# Patient Record
Sex: FEMALE | Race: BLACK OR AFRICAN AMERICAN | Employment: OTHER | URBAN - NONMETROPOLITAN AREA
[De-identification: names, ages, dates, MRNs, and addresses within clinical notes are randomized per-mention and may not be internally consistent; named-entity substitution may affect disease eponyms.]

---

## 2022-01-01 ENCOUNTER — APPOINTMENT (OUTPATIENT)
Dept: ULTRASOUND IMAGING | Age: 72
DRG: 870 | End: 2022-01-01
Attending: HOSPITALIST
Payer: MEDICARE

## 2022-01-01 ENCOUNTER — APPOINTMENT (OUTPATIENT)
Dept: GENERAL RADIOLOGY | Age: 72
DRG: 870 | End: 2022-01-01
Attending: HOSPITALIST
Payer: MEDICARE

## 2022-01-01 ENCOUNTER — HOSPITAL ENCOUNTER (INPATIENT)
Age: 72
LOS: 4 days | DRG: 870 | End: 2022-12-17
Attending: HOSPITALIST
Payer: MEDICARE

## 2022-01-01 VITALS
DIASTOLIC BLOOD PRESSURE: 57 MMHG | BODY MASS INDEX: 36.12 KG/M2 | RESPIRATION RATE: 38 BRPM | TEMPERATURE: 97.5 F | RESPIRATION RATE: 38 BRPM | SYSTOLIC BLOOD PRESSURE: 102 MMHG | HEIGHT: 67 IN | WEIGHT: 230.16 LBS | WEIGHT: 230.16 LBS | HEART RATE: 110 BPM | DIASTOLIC BLOOD PRESSURE: 57 MMHG | BODY MASS INDEX: 36.12 KG/M2 | OXYGEN SATURATION: 98 % | TEMPERATURE: 97.5 F | HEIGHT: 67 IN | SYSTOLIC BLOOD PRESSURE: 102 MMHG | HEART RATE: 110 BPM | OXYGEN SATURATION: 98 %

## 2022-01-01 LAB
ABO/RH: NORMAL
ABO/RH: NORMAL
ADENOVIRUS BY PCR: NOT DETECTED
ADENOVIRUS BY PCR: NOT DETECTED
ALBUMIN SERPL-MCNC: 2.3 G/DL (ref 3.5–5.2)
ALBUMIN SERPL-MCNC: 2.3 G/DL (ref 3.5–5.2)
ALBUMIN SERPL-MCNC: 2.6 G/DL (ref 3.5–5.2)
ALBUMIN SERPL-MCNC: 2.7 G/DL (ref 3.5–5.2)
ALBUMIN SERPL-MCNC: 2.7 G/DL (ref 3.5–5.2)
ALBUMIN SERPL-MCNC: 2.9 G/DL (ref 3.5–5.2)
ALBUMIN SERPL-MCNC: 2.9 G/DL (ref 3.5–5.2)
ALLENS TEST: ABNORMAL
ALP BLD-CCNC: 134 U/L (ref 35–104)
ALP BLD-CCNC: 134 U/L (ref 35–104)
ALP BLD-CCNC: 146 U/L (ref 35–104)
ALP BLD-CCNC: 146 U/L (ref 35–104)
ALP BLD-CCNC: 167 U/L (ref 35–104)
ALP BLD-CCNC: 172 U/L (ref 35–104)
ALP BLD-CCNC: 172 U/L (ref 35–104)
ALT SERPL-CCNC: 286 U/L (ref 5–33)
ALT SERPL-CCNC: 286 U/L (ref 5–33)
ALT SERPL-CCNC: 449 U/L (ref 5–33)
ALT SERPL-CCNC: 449 U/L (ref 5–33)
ALT SERPL-CCNC: 515 U/L (ref 5–33)
ALT SERPL-CCNC: 515 U/L (ref 5–33)
ALT SERPL-CCNC: 545 U/L (ref 5–33)
ALT SERPL-CCNC: 545 U/L (ref 5–33)
ALT SERPL-CCNC: 565 U/L (ref 5–33)
ALT SERPL-CCNC: 565 U/L (ref 5–33)
AMORPHOUS: ABNORMAL /HPF
AMORPHOUS: ABNORMAL /HPF
ANION GAP SERPL CALCULATED.3IONS-SCNC: 10 MMOL/L (ref 7–19)
ANION GAP SERPL CALCULATED.3IONS-SCNC: 13 MMOL/L (ref 7–19)
ANION GAP SERPL CALCULATED.3IONS-SCNC: 13 MMOL/L (ref 7–19)
ANION GAP SERPL CALCULATED.3IONS-SCNC: 19 MMOL/L (ref 7–19)
ANION GAP SERPL CALCULATED.3IONS-SCNC: 19 MMOL/L (ref 7–19)
ANION GAP SERPL CALCULATED.3IONS-SCNC: 8 MMOL/L (ref 7–19)
ANION GAP SERPL CALCULATED.3IONS-SCNC: 8 MMOL/L (ref 7–19)
ANTIBODY SCREEN: NORMAL
ANTIBODY SCREEN: NORMAL
AST SERPL-CCNC: 1049 U/L (ref 5–32)
AST SERPL-CCNC: 1049 U/L (ref 5–32)
AST SERPL-CCNC: 232 U/L (ref 5–32)
AST SERPL-CCNC: 232 U/L (ref 5–32)
AST SERPL-CCNC: 300 U/L (ref 5–32)
AST SERPL-CCNC: 300 U/L (ref 5–32)
AST SERPL-CCNC: 775 U/L (ref 5–32)
AST SERPL-CCNC: 775 U/L (ref 5–32)
AST SERPL-CCNC: 810 U/L (ref 5–32)
AST SERPL-CCNC: 810 U/L (ref 5–32)
BANDED NEUTROPHILS RELATIVE PERCENT: 11 % (ref 0–5)
BANDED NEUTROPHILS RELATIVE PERCENT: 11 % (ref 0–5)
BASE EXCESS ARTERIAL: -10.8 MMOL/L (ref -2–2)
BASE EXCESS ARTERIAL: -10.8 MMOL/L (ref -2–2)
BASE EXCESS ARTERIAL: 2.3 MMOL/L (ref -2–2)
BASE EXCESS ARTERIAL: 2.3 MMOL/L (ref -2–2)
BASE EXCESS ARTERIAL: 7.1 MMOL/L (ref -2–2)
BASOPHILS ABSOLUTE: 0 K/UL (ref 0–0.2)
BASOPHILS ABSOLUTE: 0.1 K/UL (ref 0–0.2)
BASOPHILS ABSOLUTE: 0.1 K/UL (ref 0–0.2)
BASOPHILS RELATIVE PERCENT: 0 % (ref 0–1)
BASOPHILS RELATIVE PERCENT: 0 % (ref 0–1)
BASOPHILS RELATIVE PERCENT: 0.3 % (ref 0–1)
BASOPHILS RELATIVE PERCENT: 0.3 % (ref 0–1)
BASOPHILS RELATIVE PERCENT: 0.4 % (ref 0–1)
BASOPHILS RELATIVE PERCENT: 0.4 % (ref 0–1)
BASOPHILS RELATIVE PERCENT: 0.5 % (ref 0–1)
BASOPHILS RELATIVE PERCENT: 0.5 % (ref 0–1)
BILIRUB SERPL-MCNC: 0.3 MG/DL (ref 0.2–1.2)
BILIRUB SERPL-MCNC: 0.3 MG/DL (ref 0.2–1.2)
BILIRUB SERPL-MCNC: 0.4 MG/DL (ref 0.2–1.2)
BILIRUB SERPL-MCNC: 0.4 MG/DL (ref 0.2–1.2)
BILIRUB SERPL-MCNC: 0.5 MG/DL (ref 0.2–1.2)
BILIRUB SERPL-MCNC: 1.5 MG/DL (ref 0.2–1.2)
BILIRUB SERPL-MCNC: 1.5 MG/DL (ref 0.2–1.2)
BILIRUBIN URINE: ABNORMAL
BILIRUBIN URINE: ABNORMAL
BLOOD BANK DISPENSE STATUS: NORMAL
BLOOD BANK DISPENSE STATUS: NORMAL
BLOOD BANK PRODUCT CODE: NORMAL
BLOOD BANK PRODUCT CODE: NORMAL
BLOOD, URINE: ABNORMAL
BLOOD, URINE: ABNORMAL
BORDETELLA PARAPERTUSSIS BY PCR: NOT DETECTED
BORDETELLA PARAPERTUSSIS BY PCR: NOT DETECTED
BORDETELLA PERTUSSIS BY PCR: NOT DETECTED
BORDETELLA PERTUSSIS BY PCR: NOT DETECTED
BPU ID: NORMAL
BPU ID: NORMAL
BUN BLDV-MCNC: 24 MG/DL (ref 8–23)
BUN BLDV-MCNC: 24 MG/DL (ref 8–23)
BUN BLDV-MCNC: 44 MG/DL (ref 8–23)
BUN BLDV-MCNC: 44 MG/DL (ref 8–23)
BUN BLDV-MCNC: 55 MG/DL (ref 8–23)
BUN BLDV-MCNC: 55 MG/DL (ref 8–23)
BUN BLDV-MCNC: 57 MG/DL (ref 8–23)
BUN BLDV-MCNC: 57 MG/DL (ref 8–23)
BUN BLDV-MCNC: 65 MG/DL (ref 8–23)
BUN BLDV-MCNC: 65 MG/DL (ref 8–23)
C-REACTIVE PROTEIN: 10.6 MG/DL (ref 0–0.5)
C-REACTIVE PROTEIN: 10.6 MG/DL (ref 0–0.5)
C-REACTIVE PROTEIN: 15.16 MG/DL (ref 0–0.5)
C-REACTIVE PROTEIN: 15.16 MG/DL (ref 0–0.5)
C-REACTIVE PROTEIN: 2.67 MG/DL (ref 0–0.5)
C-REACTIVE PROTEIN: 2.67 MG/DL (ref 0–0.5)
C-REACTIVE PROTEIN: 5.24 MG/DL (ref 0–0.5)
C-REACTIVE PROTEIN: 5.24 MG/DL (ref 0–0.5)
CALCIUM SERPL-MCNC: 7.7 MG/DL (ref 8.8–10.2)
CALCIUM SERPL-MCNC: 7.7 MG/DL (ref 8.8–10.2)
CALCIUM SERPL-MCNC: 7.8 MG/DL (ref 8.8–10.2)
CALCIUM SERPL-MCNC: 7.8 MG/DL (ref 8.8–10.2)
CALCIUM SERPL-MCNC: 8.1 MG/DL (ref 8.8–10.2)
CALCIUM SERPL-MCNC: 8.1 MG/DL (ref 8.8–10.2)
CALCIUM SERPL-MCNC: 8.2 MG/DL (ref 8.8–10.2)
CALCIUM SERPL-MCNC: 8.2 MG/DL (ref 8.8–10.2)
CALCIUM SERPL-MCNC: 8.6 MG/DL (ref 8.8–10.2)
CALCIUM SERPL-MCNC: 8.6 MG/DL (ref 8.8–10.2)
CARBOXYHEMOGLOBIN ARTERIAL: 1.5 % (ref 0–5)
CARBOXYHEMOGLOBIN ARTERIAL: 1.5 % (ref 0–5)
CARBOXYHEMOGLOBIN ARTERIAL: 1.6 % (ref 0–5)
CARBOXYHEMOGLOBIN ARTERIAL: 1.6 % (ref 0–5)
CARBOXYHEMOGLOBIN ARTERIAL: 1.7 % (ref 0–5)
CARBOXYHEMOGLOBIN ARTERIAL: 1.7 % (ref 0–5)
CARBOXYHEMOGLOBIN ARTERIAL: 2.1 % (ref 0–5)
CARBOXYHEMOGLOBIN ARTERIAL: 2.1 % (ref 0–5)
CHLAMYDOPHILIA PNEUMONIAE BY PCR: NOT DETECTED
CHLAMYDOPHILIA PNEUMONIAE BY PCR: NOT DETECTED
CHLORIDE BLD-SCNC: 100 MMOL/L (ref 98–111)
CHLORIDE BLD-SCNC: 101 MMOL/L (ref 98–111)
CHLORIDE BLD-SCNC: 101 MMOL/L (ref 98–111)
CHLORIDE BLD-SCNC: 102 MMOL/L (ref 98–111)
CHLORIDE BLD-SCNC: 102 MMOL/L (ref 98–111)
CHLORIDE BLD-SCNC: 103 MMOL/L (ref 98–111)
CHLORIDE BLD-SCNC: 103 MMOL/L (ref 98–111)
CHOLESTEROL, TOTAL: 83 MG/DL (ref 160–199)
CHOLESTEROL, TOTAL: 83 MG/DL (ref 160–199)
CLARITY: ABNORMAL
CLARITY: ABNORMAL
CO2: 21 MMOL/L (ref 22–29)
CO2: 21 MMOL/L (ref 22–29)
CO2: 25 MMOL/L (ref 22–29)
CO2: 25 MMOL/L (ref 22–29)
CO2: 28 MMOL/L (ref 22–29)
CO2: 31 MMOL/L (ref 22–29)
CO2: 31 MMOL/L (ref 22–29)
COARSE CASTS, UA: ABNORMAL /LPF (ref 0–5)
COARSE CASTS, UA: ABNORMAL /LPF (ref 0–5)
COLOR: ABNORMAL
COLOR: ABNORMAL
CORONAVIRUS 229E BY PCR: NOT DETECTED
CORONAVIRUS 229E BY PCR: NOT DETECTED
CORONAVIRUS HKU1 BY PCR: NOT DETECTED
CORONAVIRUS HKU1 BY PCR: NOT DETECTED
CORONAVIRUS NL63 BY PCR: NOT DETECTED
CORONAVIRUS NL63 BY PCR: NOT DETECTED
CORONAVIRUS OC43 BY PCR: NOT DETECTED
CORONAVIRUS OC43 BY PCR: NOT DETECTED
CREAT SERPL-MCNC: 1.1 MG/DL (ref 0.5–0.9)
CREAT SERPL-MCNC: 1.3 MG/DL (ref 0.5–0.9)
CREAT SERPL-MCNC: 1.3 MG/DL (ref 0.5–0.9)
CREAT SERPL-MCNC: 1.5 MG/DL (ref 0.5–0.9)
CREAT SERPL-MCNC: 1.5 MG/DL (ref 0.5–0.9)
CREAT SERPL-MCNC: 1.6 MG/DL (ref 0.5–0.9)
CREAT SERPL-MCNC: 1.6 MG/DL (ref 0.5–0.9)
CRYSTALS, UA: ABNORMAL /HPF
CRYSTALS, UA: ABNORMAL /HPF
CULTURE, RESPIRATORY: ABNORMAL
DESCRIPTION BLOOD BANK: NORMAL
DESCRIPTION BLOOD BANK: NORMAL
EKG P AXIS: 65 DEGREES
EKG P AXIS: 65 DEGREES
EKG P AXIS: 71 DEGREES
EKG P AXIS: 71 DEGREES
EKG P AXIS: 77 DEGREES
EKG P AXIS: 77 DEGREES
EKG P-R INTERVAL: 150 MS
EKG P-R INTERVAL: 150 MS
EKG P-R INTERVAL: 160 MS
EKG P-R INTERVAL: 160 MS
EKG P-R INTERVAL: 170 MS
EKG P-R INTERVAL: 170 MS
EKG Q-T INTERVAL: 422 MS
EKG Q-T INTERVAL: 422 MS
EKG Q-T INTERVAL: 470 MS
EKG Q-T INTERVAL: 470 MS
EKG Q-T INTERVAL: 502 MS
EKG Q-T INTERVAL: 502 MS
EKG QRS DURATION: 74 MS
EKG QRS DURATION: 74 MS
EKG QRS DURATION: 78 MS
EKG QRS DURATION: 78 MS
EKG QRS DURATION: 82 MS
EKG QRS DURATION: 82 MS
EKG QTC CALCULATION (BAZETT): 427 MS
EKG QTC CALCULATION (BAZETT): 427 MS
EKG QTC CALCULATION (BAZETT): 480 MS
EKG QTC CALCULATION (BAZETT): 480 MS
EKG QTC CALCULATION (BAZETT): 501 MS
EKG QTC CALCULATION (BAZETT): 501 MS
EKG T AXIS: -111 DEGREES
EKG T AXIS: -111 DEGREES
EKG T AXIS: -151 DEGREES
EKG T AXIS: -151 DEGREES
EKG T AXIS: 74 DEGREES
EKG T AXIS: 74 DEGREES
EOSINOPHILS ABSOLUTE: 0 K/UL (ref 0–0.6)
EOSINOPHILS RELATIVE PERCENT: 0 % (ref 0–5)
EOSINOPHILS RELATIVE PERCENT: 0.1 % (ref 0–5)
EOSINOPHILS RELATIVE PERCENT: 0.1 % (ref 0–5)
EPITHELIAL CELLS, UA: ABNORMAL /HPF
EPITHELIAL CELLS, UA: ABNORMAL /HPF
FIBRINOGEN: 438 MG/DL (ref 238–505)
FIBRINOGEN: 438 MG/DL (ref 238–505)
FIBRINOGEN: 454 MG/DL (ref 238–505)
FIBRINOGEN: 454 MG/DL (ref 238–505)
FIBRINOGEN: 553 MG/DL (ref 238–505)
FIBRINOGEN: 553 MG/DL (ref 238–505)
FIO2: 100 %
FIO2: 100 %
FIO2: 50 %
FIO2: 50 %
FIO2: 60 %
GFR SERPL CREATININE-BSD FRML MDRD: 34 ML/MIN/{1.73_M2}
GFR SERPL CREATININE-BSD FRML MDRD: 34 ML/MIN/{1.73_M2}
GFR SERPL CREATININE-BSD FRML MDRD: 37 ML/MIN/{1.73_M2}
GFR SERPL CREATININE-BSD FRML MDRD: 37 ML/MIN/{1.73_M2}
GFR SERPL CREATININE-BSD FRML MDRD: 44 ML/MIN/{1.73_M2}
GFR SERPL CREATININE-BSD FRML MDRD: 44 ML/MIN/{1.73_M2}
GFR SERPL CREATININE-BSD FRML MDRD: 54 ML/MIN/{1.73_M2}
GLUCOSE BLD-MCNC: 136 MG/DL (ref 74–109)
GLUCOSE BLD-MCNC: 136 MG/DL (ref 74–109)
GLUCOSE BLD-MCNC: 146 MG/DL (ref 74–109)
GLUCOSE BLD-MCNC: 146 MG/DL (ref 74–109)
GLUCOSE BLD-MCNC: 189 MG/DL (ref 74–109)
GLUCOSE BLD-MCNC: 189 MG/DL (ref 74–109)
GLUCOSE BLD-MCNC: 190 MG/DL (ref 74–109)
GLUCOSE BLD-MCNC: 190 MG/DL (ref 74–109)
GLUCOSE BLD-MCNC: 463 MG/DL (ref 74–109)
GLUCOSE BLD-MCNC: 463 MG/DL (ref 74–109)
GLUCOSE URINE: NEGATIVE MG/DL
GLUCOSE URINE: NEGATIVE MG/DL
GRAM STAIN RESULT: ABNORMAL
GRAM STAIN RESULT: ABNORMAL
HAV IGM SER IA-ACNC: NORMAL
HAV IGM SER IA-ACNC: NORMAL
HBA1C MFR BLD: 5.3 % (ref 4–6)
HBA1C MFR BLD: 5.3 % (ref 4–6)
HCO3 ARTERIAL: 15.9 MMOL/L (ref 22–26)
HCO3 ARTERIAL: 15.9 MMOL/L (ref 22–26)
HCO3 ARTERIAL: 28.3 MMOL/L (ref 22–26)
HCO3 ARTERIAL: 28.3 MMOL/L (ref 22–26)
HCO3 ARTERIAL: 32.5 MMOL/L (ref 22–26)
HCO3 ARTERIAL: 32.5 MMOL/L (ref 22–26)
HCO3 ARTERIAL: 33 MMOL/L (ref 22–26)
HCO3 ARTERIAL: 33 MMOL/L (ref 22–26)
HCT VFR BLD CALC: 23.9 % (ref 37–47)
HCT VFR BLD CALC: 23.9 % (ref 37–47)
HCT VFR BLD CALC: 25.1 % (ref 37–47)
HCT VFR BLD CALC: 25.1 % (ref 37–47)
HCT VFR BLD CALC: 34.1 % (ref 37–47)
HCT VFR BLD CALC: 34.1 % (ref 37–47)
HCT VFR BLD CALC: 34.2 % (ref 37–47)
HCT VFR BLD CALC: 34.2 % (ref 37–47)
HCT VFR BLD CALC: 34.4 % (ref 37–47)
HCT VFR BLD CALC: 34.4 % (ref 37–47)
HCT VFR BLD CALC: 37.6 % (ref 37–47)
HCT VFR BLD CALC: 37.6 % (ref 37–47)
HDLC SERPL-MCNC: 35 MG/DL (ref 65–121)
HDLC SERPL-MCNC: 35 MG/DL (ref 65–121)
HEMOGLOBIN, ART, EXTENDED: 11.1 G/DL (ref 12–16)
HEMOGLOBIN, ART, EXTENDED: 11.1 G/DL (ref 12–16)
HEMOGLOBIN, ART, EXTENDED: 11.7 G/DL (ref 12–16)
HEMOGLOBIN, ART, EXTENDED: 11.7 G/DL (ref 12–16)
HEMOGLOBIN, ART, EXTENDED: 12.3 G/DL (ref 12–16)
HEMOGLOBIN, ART, EXTENDED: 12.3 G/DL (ref 12–16)
HEMOGLOBIN, ART, EXTENDED: 7.7 G/DL (ref 12–16)
HEMOGLOBIN, ART, EXTENDED: 7.7 G/DL (ref 12–16)
HEMOGLOBIN: 10.9 G/DL (ref 12–16)
HEMOGLOBIN: 10.9 G/DL (ref 12–16)
HEMOGLOBIN: 11 G/DL (ref 12–16)
HEMOGLOBIN: 11 G/DL (ref 12–16)
HEMOGLOBIN: 11.3 G/DL (ref 12–16)
HEMOGLOBIN: 11.3 G/DL (ref 12–16)
HEMOGLOBIN: 12.1 G/DL (ref 12–16)
HEMOGLOBIN: 12.1 G/DL (ref 12–16)
HEMOGLOBIN: 7.4 G/DL (ref 12–16)
HEMOGLOBIN: 7.4 G/DL (ref 12–16)
HEMOGLOBIN: 7.8 G/DL (ref 12–16)
HEMOGLOBIN: 7.8 G/DL (ref 12–16)
HEPATITIS B CORE IGM ANTIBODY: NORMAL
HEPATITIS B CORE IGM ANTIBODY: NORMAL
HEPATITIS B SURFACE ANTIGEN INTERPRETATION: NORMAL
HEPATITIS B SURFACE ANTIGEN INTERPRETATION: NORMAL
HEPATITIS C ANTIBODY INTERPRETATION: NORMAL
HEPATITIS C ANTIBODY INTERPRETATION: NORMAL
HUMAN METAPNEUMOVIRUS BY PCR: NOT DETECTED
HUMAN METAPNEUMOVIRUS BY PCR: NOT DETECTED
HUMAN RHINOVIRUS/ENTEROVIRUS BY PCR: DETECTED
HUMAN RHINOVIRUS/ENTEROVIRUS BY PCR: DETECTED
HYALINE CASTS: ABNORMAL /LPF (ref 0–5)
HYALINE CASTS: ABNORMAL /LPF (ref 0–5)
IMMATURE GRANULOCYTES #: 0.5 K/UL
IMMATURE GRANULOCYTES #: 0.5 K/UL
IMMATURE GRANULOCYTES #: 0.7 K/UL
IMMATURE GRANULOCYTES #: 0.7 K/UL
IMMATURE GRANULOCYTES #: 1.3 K/UL
IMMATURE GRANULOCYTES #: 1.3 K/UL
IMMATURE GRANULOCYTES #: 6.5 K/UL
IMMATURE GRANULOCYTES #: 6.5 K/UL
INFLUENZA A H1-2009 BY PCR: DETECTED
INFLUENZA A H1-2009 BY PCR: DETECTED
INFLUENZA B BY PCR: NOT DETECTED
INFLUENZA B BY PCR: NOT DETECTED
KETONES, URINE: ABNORMAL MG/DL
KETONES, URINE: ABNORMAL MG/DL
L. PNEUMOPHILA SEROGP 1 UR AG: NORMAL
L. PNEUMOPHILA SEROGP 1 UR AG: NORMAL
LACTIC ACID: 13.3 MMOL/L (ref 0.5–1.9)
LACTIC ACID: 13.3 MMOL/L (ref 0.5–1.9)
LACTIC ACID: 2.2 MMOL/L (ref 0.5–1.9)
LACTIC ACID: 2.2 MMOL/L (ref 0.5–1.9)
LDL CHOLESTEROL CALCULATED: 30 MG/DL
LDL CHOLESTEROL CALCULATED: 30 MG/DL
LEUKOCYTE ESTERASE, URINE: NEGATIVE
LEUKOCYTE ESTERASE, URINE: NEGATIVE
LIPASE: 20 U/L (ref 13–60)
LIPASE: 20 U/L (ref 13–60)
LYMPHOCYTES ABSOLUTE: 1.1 K/UL (ref 1.1–4.5)
LYMPHOCYTES ABSOLUTE: 1.3 K/UL (ref 1.1–4.5)
LYMPHOCYTES ABSOLUTE: 1.3 K/UL (ref 1.1–4.5)
LYMPHOCYTES ABSOLUTE: 5.6 K/UL (ref 1.1–4.5)
LYMPHOCYTES ABSOLUTE: 5.6 K/UL (ref 1.1–4.5)
LYMPHOCYTES RELATIVE PERCENT: 10.5 % (ref 20–40)
LYMPHOCYTES RELATIVE PERCENT: 10.5 % (ref 20–40)
LYMPHOCYTES RELATIVE PERCENT: 14 % (ref 20–40)
LYMPHOCYTES RELATIVE PERCENT: 14 % (ref 20–40)
LYMPHOCYTES RELATIVE PERCENT: 8.7 % (ref 20–40)
LYMPHOCYTES RELATIVE PERCENT: 8.7 % (ref 20–40)
LYMPHOCYTES RELATIVE PERCENT: 9.5 % (ref 20–40)
LYMPHOCYTES RELATIVE PERCENT: 9.5 % (ref 20–40)
MAGNESIUM: 2 MG/DL (ref 1.6–2.4)
MAGNESIUM: 2.3 MG/DL (ref 1.6–2.4)
MAGNESIUM: 2.3 MG/DL (ref 1.6–2.4)
MAGNESIUM: 2.5 MG/DL (ref 1.6–2.4)
MAGNESIUM: 2.5 MG/DL (ref 1.6–2.4)
MAGNESIUM: 2.9 MG/DL (ref 1.6–2.4)
MAGNESIUM: 2.9 MG/DL (ref 1.6–2.4)
MCH RBC QN AUTO: 28.5 PG (ref 27–31)
MCH RBC QN AUTO: 28.5 PG (ref 27–31)
MCH RBC QN AUTO: 28.7 PG (ref 27–31)
MCH RBC QN AUTO: 28.9 PG (ref 27–31)
MCHC RBC AUTO-ENTMCNC: 31 G/DL (ref 33–37)
MCHC RBC AUTO-ENTMCNC: 31 G/DL (ref 33–37)
MCHC RBC AUTO-ENTMCNC: 32 G/DL (ref 33–37)
MCHC RBC AUTO-ENTMCNC: 32 G/DL (ref 33–37)
MCHC RBC AUTO-ENTMCNC: 32.2 G/DL (ref 33–37)
MCHC RBC AUTO-ENTMCNC: 32.8 G/DL (ref 33–37)
MCHC RBC AUTO-ENTMCNC: 32.8 G/DL (ref 33–37)
MCV RBC AUTO: 88 FL (ref 81–99)
MCV RBC AUTO: 88 FL (ref 81–99)
MCV RBC AUTO: 88.5 FL (ref 81–99)
MCV RBC AUTO: 88.5 FL (ref 81–99)
MCV RBC AUTO: 89.3 FL (ref 81–99)
MCV RBC AUTO: 89.3 FL (ref 81–99)
MCV RBC AUTO: 89.7 FL (ref 81–99)
MCV RBC AUTO: 89.7 FL (ref 81–99)
MCV RBC AUTO: 93.4 FL (ref 81–99)
MCV RBC AUTO: 93.4 FL (ref 81–99)
MECHANICAL RATE IN BPM: 18
MECHANICAL RATE IN BPM: 20
MECHANICAL RATE IN BPM: 20
METAMYELOCYTES RELATIVE PERCENT: 3 %
METAMYELOCYTES RELATIVE PERCENT: 3 %
METHEMOGLOBIN ARTERIAL: 1 %
METHEMOGLOBIN ARTERIAL: 1 %
METHEMOGLOBIN ARTERIAL: 1.2 %
METHEMOGLOBIN ARTERIAL: 1.2 %
METHEMOGLOBIN ARTERIAL: 1.3 %
METHEMOGLOBIN ARTERIAL: 1.3 %
METHEMOGLOBIN ARTERIAL: 1.6 %
METHEMOGLOBIN ARTERIAL: 1.6 %
MODE: ABNORMAL
MONOCYTES ABSOLUTE: 0.4 K/UL (ref 0–0.9)
MONOCYTES ABSOLUTE: 0.4 K/UL (ref 0–0.9)
MONOCYTES ABSOLUTE: 0.6 K/UL (ref 0–0.9)
MONOCYTES ABSOLUTE: 0.6 K/UL (ref 0–0.9)
MONOCYTES ABSOLUTE: 0.7 K/UL (ref 0–0.9)
MONOCYTES ABSOLUTE: 0.7 K/UL (ref 0–0.9)
MONOCYTES ABSOLUTE: 1.1 K/UL (ref 0–0.9)
MONOCYTES ABSOLUTE: 1.1 K/UL (ref 0–0.9)
MONOCYTES RELATIVE PERCENT: 1 % (ref 0–10)
MONOCYTES RELATIVE PERCENT: 1 % (ref 0–10)
MONOCYTES RELATIVE PERCENT: 5.1 % (ref 0–10)
MONOCYTES RELATIVE PERCENT: 5.1 % (ref 0–10)
MONOCYTES RELATIVE PERCENT: 5.4 % (ref 0–10)
MONOCYTES RELATIVE PERCENT: 5.4 % (ref 0–10)
MONOCYTES RELATIVE PERCENT: 7.4 % (ref 0–10)
MONOCYTES RELATIVE PERCENT: 7.4 % (ref 0–10)
MRSA SCREEN RT-PCR: NOT DETECTED
MRSA SCREEN RT-PCR: NOT DETECTED
MYCOPLASMA PNEUMONIAE BY PCR: NOT DETECTED
MYCOPLASMA PNEUMONIAE BY PCR: NOT DETECTED
NEUTROPHILS ABSOLUTE: 11.1 K/UL (ref 1.5–7.5)
NEUTROPHILS ABSOLUTE: 11.1 K/UL (ref 1.5–7.5)
NEUTROPHILS ABSOLUTE: 34.2 K/UL (ref 1.5–7.5)
NEUTROPHILS ABSOLUTE: 34.2 K/UL (ref 1.5–7.5)
NEUTROPHILS ABSOLUTE: 8.5 K/UL (ref 1.5–7.5)
NEUTROPHILS ABSOLUTE: 8.5 K/UL (ref 1.5–7.5)
NEUTROPHILS ABSOLUTE: 9.6 K/UL (ref 1.5–7.5)
NEUTROPHILS ABSOLUTE: 9.6 K/UL (ref 1.5–7.5)
NEUTROPHILS RELATIVE PERCENT: 71 % (ref 50–65)
NEUTROPHILS RELATIVE PERCENT: 71 % (ref 50–65)
NEUTROPHILS RELATIVE PERCENT: 74.5 % (ref 50–65)
NEUTROPHILS RELATIVE PERCENT: 74.5 % (ref 50–65)
NEUTROPHILS RELATIVE PERCENT: 77.9 % (ref 50–65)
NEUTROPHILS RELATIVE PERCENT: 77.9 % (ref 50–65)
NEUTROPHILS RELATIVE PERCENT: 80.4 % (ref 50–65)
NEUTROPHILS RELATIVE PERCENT: 80.4 % (ref 50–65)
NITRITE, URINE: NEGATIVE
NITRITE, URINE: NEGATIVE
O2 CONTENT ARTERIAL: 11.4 ML/DL
O2 CONTENT ARTERIAL: 11.4 ML/DL
O2 CONTENT ARTERIAL: 14.8 ML/DL
O2 CONTENT ARTERIAL: 14.8 ML/DL
O2 CONTENT ARTERIAL: 15.7 ML/DL
O2 CONTENT ARTERIAL: 15.7 ML/DL
O2 CONTENT ARTERIAL: 16.4 ML/DL
O2 CONTENT ARTERIAL: 16.4 ML/DL
O2 SAT, ARTERIAL: 94.4 %
O2 SAT, ARTERIAL: 94.4 %
O2 SAT, ARTERIAL: 94.7 %
O2 SAT, ARTERIAL: 94.7 %
O2 SAT, ARTERIAL: 95.1 %
O2 SAT, ARTERIAL: 95.1 %
O2 SAT, ARTERIAL: 97.2 %
O2 SAT, ARTERIAL: 97.2 %
O2 THERAPY: ABNORMAL
ORGANISM: ABNORMAL
ORGANISM: ABNORMAL
PARAINFLUENZA VIRUS 1 BY PCR: NOT DETECTED
PARAINFLUENZA VIRUS 1 BY PCR: NOT DETECTED
PARAINFLUENZA VIRUS 2 BY PCR: NOT DETECTED
PARAINFLUENZA VIRUS 2 BY PCR: NOT DETECTED
PARAINFLUENZA VIRUS 3 BY PCR: NOT DETECTED
PARAINFLUENZA VIRUS 3 BY PCR: NOT DETECTED
PARAINFLUENZA VIRUS 4 BY PCR: NOT DETECTED
PARAINFLUENZA VIRUS 4 BY PCR: NOT DETECTED
PCO2 ARTERIAL: 38 MMHG (ref 35–45)
PCO2 ARTERIAL: 38 MMHG (ref 35–45)
PCO2 ARTERIAL: 49 MMHG (ref 35–45)
PCO2 ARTERIAL: 52 MMHG (ref 35–45)
PCO2 ARTERIAL: 52 MMHG (ref 35–45)
PDW BLD-RTO: 12.9 % (ref 11.5–14.5)
PDW BLD-RTO: 12.9 % (ref 11.5–14.5)
PDW BLD-RTO: 13 % (ref 11.5–14.5)
PDW BLD-RTO: 13 % (ref 11.5–14.5)
PDW BLD-RTO: 13.1 % (ref 11.5–14.5)
PDW BLD-RTO: 13.2 % (ref 11.5–14.5)
PDW BLD-RTO: 13.2 % (ref 11.5–14.5)
PH ARTERIAL: 7.23 (ref 7.35–7.45)
PH ARTERIAL: 7.23 (ref 7.35–7.45)
PH ARTERIAL: 7.37 (ref 7.35–7.45)
PH ARTERIAL: 7.37 (ref 7.35–7.45)
PH ARTERIAL: 7.41 (ref 7.35–7.45)
PH ARTERIAL: 7.41 (ref 7.35–7.45)
PH ARTERIAL: 7.43 (ref 7.35–7.45)
PH ARTERIAL: 7.43 (ref 7.35–7.45)
PH UA: 5.5 (ref 5–8)
PH UA: 5.5 (ref 5–8)
PHOSPHORUS: 4.1 MG/DL (ref 2.5–4.5)
PHOSPHORUS: 4.1 MG/DL (ref 2.5–4.5)
PLATELET # BLD: 136 K/UL (ref 130–400)
PLATELET # BLD: 136 K/UL (ref 130–400)
PLATELET # BLD: 139 K/UL (ref 130–400)
PLATELET # BLD: 139 K/UL (ref 130–400)
PLATELET # BLD: 142 K/UL (ref 130–400)
PLATELET # BLD: 142 K/UL (ref 130–400)
PLATELET # BLD: 158 K/UL (ref 130–400)
PLATELET # BLD: 158 K/UL (ref 130–400)
PLATELET # BLD: 284 K/UL (ref 130–400)
PLATELET # BLD: 284 K/UL (ref 130–400)
PLATELET SLIDE REVIEW: ADEQUATE
PLATELET SLIDE REVIEW: ADEQUATE
PMV BLD AUTO: 10 FL (ref 9.4–12.3)
PMV BLD AUTO: 10.6 FL (ref 9.4–12.3)
PMV BLD AUTO: 10.6 FL (ref 9.4–12.3)
PMV BLD AUTO: 9.7 FL (ref 9.4–12.3)
PMV BLD AUTO: 9.7 FL (ref 9.4–12.3)
PMV BLD AUTO: 9.8 FL (ref 9.4–12.3)
PMV BLD AUTO: 9.8 FL (ref 9.4–12.3)
PO2 ARTERIAL: 321 MMHG (ref 80–100)
PO2 ARTERIAL: 321 MMHG (ref 80–100)
PO2 ARTERIAL: 73 MMHG (ref 80–100)
PO2 ARTERIAL: 73 MMHG (ref 80–100)
PO2 ARTERIAL: 77 MMHG (ref 80–100)
PO2 ARTERIAL: 77 MMHG (ref 80–100)
PO2 ARTERIAL: 81 MMHG (ref 80–100)
PO2 ARTERIAL: 81 MMHG (ref 80–100)
POSITIVE END EXP PRESS: 10
POSITIVE END EXP PRESS: 5
POSITIVE END EXP PRESS: 5
POTASSIUM SERPL-SCNC: 3.7 MMOL/L (ref 3.5–5)
POTASSIUM SERPL-SCNC: 4 MMOL/L (ref 3.5–5)
POTASSIUM SERPL-SCNC: 4 MMOL/L (ref 3.5–5)
POTASSIUM SERPL-SCNC: 4.4 MMOL/L (ref 3.5–5)
POTASSIUM SERPL-SCNC: 4.4 MMOL/L (ref 3.5–5)
POTASSIUM SERPL-SCNC: 5.7 MMOL/L (ref 3.5–5)
POTASSIUM SERPL-SCNC: 5.7 MMOL/L (ref 3.5–5)
POTASSIUM, WHOLE BLOOD: 3.5
POTASSIUM, WHOLE BLOOD: 3.5
POTASSIUM, WHOLE BLOOD: 3.6
POTASSIUM, WHOLE BLOOD: 3.6
POTASSIUM, WHOLE BLOOD: 4.2
POTASSIUM, WHOLE BLOOD: 4.2
POTASSIUM, WHOLE BLOOD: 5
POTASSIUM, WHOLE BLOOD: 5
PRO-BNP: 3779 PG/ML (ref 0–900)
PRO-BNP: 3779 PG/ML (ref 0–900)
PRO-BNP: 9301 PG/ML (ref 0–900)
PRO-BNP: 9301 PG/ML (ref 0–900)
PROTEIN UA: 100 MG/DL
PROTEIN UA: 100 MG/DL
RBC # BLD: 2.56 M/UL (ref 4.2–5.4)
RBC # BLD: 2.56 M/UL (ref 4.2–5.4)
RBC # BLD: 3.8 M/UL (ref 4.2–5.4)
RBC # BLD: 3.8 M/UL (ref 4.2–5.4)
RBC # BLD: 3.83 M/UL (ref 4.2–5.4)
RBC # BLD: 3.83 M/UL (ref 4.2–5.4)
RBC # BLD: 3.91 M/UL (ref 4.2–5.4)
RBC # BLD: 3.91 M/UL (ref 4.2–5.4)
RBC # BLD: 4.25 M/UL (ref 4.2–5.4)
RBC # BLD: 4.25 M/UL (ref 4.2–5.4)
RBC # BLD: NORMAL 10*6/UL
RBC # BLD: NORMAL 10*6/UL
RBC UA: ABNORMAL /HPF (ref 0–2)
RBC UA: ABNORMAL /HPF (ref 0–2)
RESPIRATORY SYNCYTIAL VIRUS BY PCR: NOT DETECTED
RESPIRATORY SYNCYTIAL VIRUS BY PCR: NOT DETECTED
SAMPLE SOURCE: ABNORMAL
SARS-COV-2, PCR: DETECTED
SARS-COV-2, PCR: DETECTED
SODIUM BLD-SCNC: 138 MMOL/L (ref 136–145)
SODIUM BLD-SCNC: 138 MMOL/L (ref 136–145)
SODIUM BLD-SCNC: 139 MMOL/L (ref 136–145)
SODIUM BLD-SCNC: 139 MMOL/L (ref 136–145)
SODIUM BLD-SCNC: 140 MMOL/L (ref 136–145)
SODIUM BLD-SCNC: 140 MMOL/L (ref 136–145)
SODIUM BLD-SCNC: 141 MMOL/L (ref 136–145)
SPECIFIC GRAVITY UA: 1.04 (ref 1–1.03)
SPECIFIC GRAVITY UA: 1.04 (ref 1–1.03)
STREP PNEUMONIAE ANTIGEN, URINE: NORMAL
STREP PNEUMONIAE ANTIGEN, URINE: NORMAL
TOTAL PROTEIN: 4 G/DL (ref 6.6–8.7)
TOTAL PROTEIN: 4 G/DL (ref 6.6–8.7)
TOTAL PROTEIN: 4.6 G/DL (ref 6.6–8.7)
TOTAL PROTEIN: 5 G/DL (ref 6.6–8.7)
TOTAL PROTEIN: 5 G/DL (ref 6.6–8.7)
TOTAL PROTEIN: 5.2 G/DL (ref 6.6–8.7)
TOTAL PROTEIN: 5.2 G/DL (ref 6.6–8.7)
TRIGL SERPL-MCNC: 91 MG/DL (ref 0–149)
TRIGL SERPL-MCNC: 91 MG/DL (ref 0–149)
TROPONIN: 0.38 NG/ML (ref 0–0.03)
TROPONIN: 0.38 NG/ML (ref 0–0.03)
TROPONIN: 0.52 NG/ML (ref 0–0.03)
TROPONIN: 0.52 NG/ML (ref 0–0.03)
TSH SERPL DL<=0.05 MIU/L-ACNC: 0.31 UIU/ML (ref 0.27–4.2)
TSH SERPL DL<=0.05 MIU/L-ACNC: 0.31 UIU/ML (ref 0.27–4.2)
URINE CULTURE, ROUTINE: NORMAL
URINE CULTURE, ROUTINE: NORMAL
UROBILINOGEN, URINE: 1 E.U./DL
UROBILINOGEN, URINE: 1 E.U./DL
VITAMIN D 25-HYDROXY: 9.6 NG/ML
VITAMIN D 25-HYDROXY: 9.6 NG/ML
VT MECHANICAL: 450 %
VT MECHANICAL: 50 %
VT MECHANICAL: 50 %
WBC # BLD: 10.9 K/UL (ref 4.8–10.8)
WBC # BLD: 10.9 K/UL (ref 4.8–10.8)
WBC # BLD: 12 K/UL (ref 4.8–10.8)
WBC # BLD: 12 K/UL (ref 4.8–10.8)
WBC # BLD: 14.9 K/UL (ref 4.8–10.8)
WBC # BLD: 14.9 K/UL (ref 4.8–10.8)
WBC # BLD: 40.2 K/UL (ref 4.8–10.8)
WBC # BLD: 40.2 K/UL (ref 4.8–10.8)
WBC # BLD: 7.3 K/UL (ref 4.8–10.8)
WBC # BLD: 7.3 K/UL (ref 4.8–10.8)
WBC UA: ABNORMAL /HPF (ref 0–5)
WBC UA: ABNORMAL /HPF (ref 0–5)

## 2022-01-01 PROCEDURE — 80061 LIPID PANEL: CPT

## 2022-01-01 PROCEDURE — 80053 COMPREHEN METABOLIC PANEL: CPT

## 2022-01-01 PROCEDURE — 86850 RBC ANTIBODY SCREEN: CPT

## 2022-01-01 PROCEDURE — 85384 FIBRINOGEN ACTIVITY: CPT

## 2022-01-01 PROCEDURE — 2700000000 HC OXYGEN THERAPY PER DAY

## 2022-01-01 PROCEDURE — 6360000002 HC RX W HCPCS: Performed by: INTERNAL MEDICINE

## 2022-01-01 PROCEDURE — 87040 BLOOD CULTURE FOR BACTERIA: CPT

## 2022-01-01 PROCEDURE — 99291 CRITICAL CARE FIRST HOUR: CPT | Performed by: INTERNAL MEDICINE

## 2022-01-01 PROCEDURE — 94760 N-INVAS EAR/PLS OXIMETRY 1: CPT

## 2022-01-01 PROCEDURE — 2500000003 HC RX 250 WO HCPCS: Performed by: INTERNAL MEDICINE

## 2022-01-01 PROCEDURE — 2100000000 HC CCU R&B

## 2022-01-01 PROCEDURE — 99222 1ST HOSP IP/OBS MODERATE 55: CPT | Performed by: INTERNAL MEDICINE

## 2022-01-01 PROCEDURE — 93005 ELECTROCARDIOGRAM TRACING: CPT | Performed by: HOSPITALIST

## 2022-01-01 PROCEDURE — 85025 COMPLETE CBC W/AUTO DIFF WBC: CPT

## 2022-01-01 PROCEDURE — 76937 US GUIDE VASCULAR ACCESS: CPT

## 2022-01-01 PROCEDURE — 82803 BLOOD GASES ANY COMBINATION: CPT

## 2022-01-01 PROCEDURE — 83605 ASSAY OF LACTIC ACID: CPT

## 2022-01-01 PROCEDURE — 83880 ASSAY OF NATRIURETIC PEPTIDE: CPT

## 2022-01-01 PROCEDURE — 6360000002 HC RX W HCPCS: Performed by: HOSPITALIST

## 2022-01-01 PROCEDURE — 86140 C-REACTIVE PROTEIN: CPT

## 2022-01-01 PROCEDURE — 6370000000 HC RX 637 (ALT 250 FOR IP): Performed by: INTERNAL MEDICINE

## 2022-01-01 PROCEDURE — 71045 X-RAY EXAM CHEST 1 VIEW: CPT

## 2022-01-01 PROCEDURE — 5A1955Z RESPIRATORY VENTILATION, GREATER THAN 96 CONSECUTIVE HOURS: ICD-10-PCS | Performed by: INTERNAL MEDICINE

## 2022-01-01 PROCEDURE — 87449 NOS EACH ORGANISM AG IA: CPT

## 2022-01-01 PROCEDURE — 76705 ECHO EXAM OF ABDOMEN: CPT

## 2022-01-01 PROCEDURE — 86923 COMPATIBILITY TEST ELECTRIC: CPT

## 2022-01-01 PROCEDURE — 37799 UNLISTED PX VASCULAR SURGERY: CPT

## 2022-01-01 PROCEDURE — 83735 ASSAY OF MAGNESIUM: CPT

## 2022-01-01 PROCEDURE — 2500000003 HC RX 250 WO HCPCS: Performed by: HOSPITALIST

## 2022-01-01 PROCEDURE — 71045 X-RAY EXAM CHEST 1 VIEW: CPT | Performed by: RADIOLOGY

## 2022-01-01 PROCEDURE — 87205 SMEAR GRAM STAIN: CPT

## 2022-01-01 PROCEDURE — 6360000002 HC RX W HCPCS

## 2022-01-01 PROCEDURE — 85014 HEMATOCRIT: CPT

## 2022-01-01 PROCEDURE — 84484 ASSAY OF TROPONIN QUANT: CPT

## 2022-01-01 PROCEDURE — 83690 ASSAY OF LIPASE: CPT

## 2022-01-01 PROCEDURE — 2580000003 HC RX 258: Performed by: HOSPITALIST

## 2022-01-01 PROCEDURE — 2500000003 HC RX 250 WO HCPCS: Performed by: STUDENT IN AN ORGANIZED HEALTH CARE EDUCATION/TRAINING PROGRAM

## 2022-01-01 PROCEDURE — 02HV33Z INSERTION OF INFUSION DEVICE INTO SUPERIOR VENA CAVA, PERCUTANEOUS APPROACH: ICD-10-PCS | Performed by: INTERNAL MEDICINE

## 2022-01-01 PROCEDURE — 94003 VENT MGMT INPAT SUBQ DAY: CPT

## 2022-01-01 PROCEDURE — C1751 CATH, INF, PER/CENT/MIDLINE: HCPCS

## 2022-01-01 PROCEDURE — 86901 BLOOD TYPING SEROLOGIC RH(D): CPT

## 2022-01-01 PROCEDURE — C9113 INJ PANTOPRAZOLE SODIUM, VIA: HCPCS | Performed by: HOSPITALIST

## 2022-01-01 PROCEDURE — 2580000003 HC RX 258: Performed by: INTERNAL MEDICINE

## 2022-01-01 PROCEDURE — 6360000004 HC RX CONTRAST MEDICATION: Performed by: INTERNAL MEDICINE

## 2022-01-01 PROCEDURE — 6370000000 HC RX 637 (ALT 250 FOR IP): Performed by: HOSPITALIST

## 2022-01-01 PROCEDURE — 6360000002 HC RX W HCPCS: Performed by: STUDENT IN AN ORGANIZED HEALTH CARE EDUCATION/TRAINING PROGRAM

## 2022-01-01 PROCEDURE — C8929 TTE W OR WO FOL WCON,DOPPLER: HCPCS

## 2022-01-01 PROCEDURE — 93005 ELECTROCARDIOGRAM TRACING: CPT | Performed by: INTERNAL MEDICINE

## 2022-01-01 PROCEDURE — 83036 HEMOGLOBIN GLYCOSYLATED A1C: CPT

## 2022-01-01 PROCEDURE — 85027 COMPLETE CBC AUTOMATED: CPT

## 2022-01-01 PROCEDURE — 99232 SBSQ HOSP IP/OBS MODERATE 35: CPT | Performed by: INTERNAL MEDICINE

## 2022-01-01 PROCEDURE — 85018 HEMOGLOBIN: CPT

## 2022-01-01 PROCEDURE — 87070 CULTURE OTHR SPECIMN AEROBIC: CPT

## 2022-01-01 PROCEDURE — 81001 URINALYSIS AUTO W/SCOPE: CPT

## 2022-01-01 PROCEDURE — 93010 ELECTROCARDIOGRAM REPORT: CPT | Performed by: INTERNAL MEDICINE

## 2022-01-01 PROCEDURE — 36600 WITHDRAWAL OF ARTERIAL BLOOD: CPT

## 2022-01-01 PROCEDURE — 80074 ACUTE HEPATITIS PANEL: CPT

## 2022-01-01 PROCEDURE — 87086 URINE CULTURE/COLONY COUNT: CPT

## 2022-01-01 PROCEDURE — 86900 BLOOD TYPING SEROLOGIC ABO: CPT

## 2022-01-01 PROCEDURE — 84100 ASSAY OF PHOSPHORUS: CPT

## 2022-01-01 PROCEDURE — 0202U NFCT DS 22 TRGT SARS-COV-2: CPT

## 2022-01-01 PROCEDURE — 84443 ASSAY THYROID STIM HORMONE: CPT

## 2022-01-01 PROCEDURE — 36415 COLL VENOUS BLD VENIPUNCTURE: CPT

## 2022-01-01 PROCEDURE — 36569 INSJ PICC 5 YR+ W/O IMAGING: CPT

## 2022-01-01 PROCEDURE — 94002 VENT MGMT INPAT INIT DAY: CPT

## 2022-01-01 PROCEDURE — 76705 ECHO EXAM OF ABDOMEN: CPT | Performed by: RADIOLOGY

## 2022-01-01 PROCEDURE — 87641 MR-STAPH DNA AMP PROBE: CPT

## 2022-01-01 PROCEDURE — XW0DXM6 INTRODUCTION OF BARICITINIB INTO MOUTH AND PHARYNX, EXTERNAL APPROACH, NEW TECHNOLOGY GROUP 6: ICD-10-PCS | Performed by: INTERNAL MEDICINE

## 2022-01-01 PROCEDURE — 3E0333Z INTRODUCTION OF ANTI-INFLAMMATORY INTO PERIPHERAL VEIN, PERCUTANEOUS APPROACH: ICD-10-PCS | Performed by: INTERNAL MEDICINE

## 2022-01-01 PROCEDURE — 82306 VITAMIN D 25 HYDROXY: CPT

## 2022-01-01 PROCEDURE — 87186 SC STD MICRODIL/AGAR DIL: CPT

## 2022-01-01 PROCEDURE — 92950 HEART/LUNG RESUSCITATION CPR: CPT

## 2022-01-01 RX ORDER — FUROSEMIDE 10 MG/ML
20 INJECTION INTRAMUSCULAR; INTRAVENOUS 2 TIMES DAILY
Status: DISCONTINUED | OUTPATIENT
Start: 2022-01-01 | End: 2022-01-01 | Stop reason: HOSPADM

## 2022-01-01 RX ORDER — MEROPENEM AND SODIUM CHLORIDE 1 G/50ML
1000 INJECTION, SOLUTION INTRAVENOUS EVERY 12 HOURS
Status: DISCONTINUED | OUTPATIENT
Start: 2022-01-01 | End: 2022-01-01 | Stop reason: HOSPADM

## 2022-01-01 RX ORDER — NOREPINEPHRINE BIT/0.9 % NACL 16MG/250ML
1-100 INFUSION BOTTLE (ML) INTRAVENOUS CONTINUOUS
Status: DISCONTINUED | OUTPATIENT
Start: 2022-01-01 | End: 2022-01-01 | Stop reason: HOSPADM

## 2022-01-01 RX ORDER — LORAZEPAM 2 MG/ML
2 INJECTION INTRAMUSCULAR EVERY 6 HOURS PRN
Status: DISCONTINUED | OUTPATIENT
Start: 2022-01-01 | End: 2022-01-01 | Stop reason: HOSPADM

## 2022-01-01 RX ORDER — PROPOFOL 10 MG/ML
INJECTION, EMULSION INTRAVENOUS
Status: COMPLETED
Start: 2022-01-01 | End: 2022-01-01

## 2022-01-01 RX ORDER — ACETAMINOPHEN 650 MG/1
650 SUPPOSITORY RECTAL EVERY 6 HOURS PRN
Status: DISCONTINUED | OUTPATIENT
Start: 2022-01-01 | End: 2022-01-01 | Stop reason: HOSPADM

## 2022-01-01 RX ORDER — SODIUM CHLORIDE 9 MG/ML
INJECTION, SOLUTION INTRAVENOUS PRN
Status: DISCONTINUED | OUTPATIENT
Start: 2022-01-01 | End: 2022-01-01 | Stop reason: SDUPTHER

## 2022-01-01 RX ORDER — MEROPENEM AND SODIUM CHLORIDE 1 G/50ML
1000 INJECTION, SOLUTION INTRAVENOUS ONCE
Status: COMPLETED | OUTPATIENT
Start: 2022-01-01 | End: 2022-01-01

## 2022-01-01 RX ORDER — OSELTAMIVIR PHOSPHATE 6 MG/ML
75 FOR SUSPENSION ORAL 2 TIMES DAILY
Status: DISCONTINUED | OUTPATIENT
Start: 2022-01-01 | End: 2022-01-01

## 2022-01-01 RX ORDER — MEROPENEM AND SODIUM CHLORIDE 1 G/50ML
1000 INJECTION, SOLUTION INTRAVENOUS EVERY 8 HOURS
Status: DISCONTINUED | OUTPATIENT
Start: 2022-01-01 | End: 2022-01-01 | Stop reason: DRUGHIGH

## 2022-01-01 RX ORDER — ERGOCALCIFEROL 1.25 MG/1
50000 CAPSULE ORAL WEEKLY
Status: DISCONTINUED | OUTPATIENT
Start: 2022-01-01 | End: 2022-01-01

## 2022-01-01 RX ORDER — ENOXAPARIN SODIUM 100 MG/ML
30 INJECTION SUBCUTANEOUS 2 TIMES DAILY
Status: DISCONTINUED | OUTPATIENT
Start: 2022-01-01 | End: 2022-01-01

## 2022-01-01 RX ORDER — MEROPENEM AND SODIUM CHLORIDE 1 G/50ML
1000 INJECTION, SOLUTION INTRAVENOUS EVERY 8 HOURS
Status: DISCONTINUED | OUTPATIENT
Start: 2022-01-01 | End: 2022-01-01

## 2022-01-01 RX ORDER — LEVOTHYROXINE SODIUM 0.03 MG/1
25 TABLET ORAL
COMMUNITY

## 2022-01-01 RX ORDER — BUSPIRONE HYDROCHLORIDE 30 MG/1
30 TABLET ORAL 2 TIMES DAILY
COMMUNITY

## 2022-01-01 RX ORDER — HYDROXYZINE 50 MG/1
50 TABLET, FILM COATED ORAL NIGHTLY
COMMUNITY

## 2022-01-01 RX ORDER — FENTANYL CITRATE-0.9 % NACL/PF 10 MCG/ML
25-200 PLASTIC BAG, INJECTION (ML) INTRAVENOUS CONTINUOUS
Status: DISCONTINUED | OUTPATIENT
Start: 2022-01-01 | End: 2022-01-01 | Stop reason: HOSPADM

## 2022-01-01 RX ORDER — BUPROPION HYDROCHLORIDE 150 MG/1
150 TABLET ORAL EVERY MORNING
COMMUNITY

## 2022-01-01 RX ORDER — DULOXETIN HYDROCHLORIDE 60 MG/1
60 CAPSULE, DELAYED RELEASE ORAL NIGHTLY
COMMUNITY

## 2022-01-01 RX ORDER — EPINEPHRINE 0.1 MG/ML
SYRINGE (ML) INJECTION DAILY PRN
Status: COMPLETED | OUTPATIENT
Start: 2022-01-01 | End: 2022-01-01

## 2022-01-01 RX ORDER — CARVEDILOL 3.12 MG/1
3.12 TABLET ORAL 2 TIMES DAILY WITH MEALS
Status: DISCONTINUED | OUTPATIENT
Start: 2022-01-01 | End: 2022-01-01 | Stop reason: HOSPADM

## 2022-01-01 RX ORDER — IPRATROPIUM BROMIDE AND ALBUTEROL SULFATE 2.5; .5 MG/3ML; MG/3ML
1 SOLUTION RESPIRATORY (INHALATION)
Status: DISCONTINUED | OUTPATIENT
Start: 2022-01-01 | End: 2022-01-01

## 2022-01-01 RX ORDER — GABAPENTIN 300 MG/1
300 CAPSULE ORAL 3 TIMES DAILY
COMMUNITY

## 2022-01-01 RX ORDER — MECOBALAMIN 5000 MCG
10 TABLET,DISINTEGRATING ORAL NIGHTLY
Status: DISCONTINUED | OUTPATIENT
Start: 2022-01-01 | End: 2022-01-01 | Stop reason: HOSPADM

## 2022-01-01 RX ORDER — ONDANSETRON 4 MG/1
4 TABLET, ORALLY DISINTEGRATING ORAL EVERY 8 HOURS PRN
COMMUNITY

## 2022-01-01 RX ORDER — SODIUM CHLORIDE 0.9 % (FLUSH) 0.9 %
5-40 SYRINGE (ML) INJECTION PRN
Status: DISCONTINUED | OUTPATIENT
Start: 2022-01-01 | End: 2022-01-01 | Stop reason: HOSPADM

## 2022-01-01 RX ORDER — METOPROLOL SUCCINATE 100 MG/1
100 TABLET, EXTENDED RELEASE ORAL NIGHTLY
COMMUNITY

## 2022-01-01 RX ORDER — SODIUM CHLORIDE 9 MG/ML
25 INJECTION, SOLUTION INTRAVENOUS PRN
Status: DISCONTINUED | OUTPATIENT
Start: 2022-01-01 | End: 2022-01-01 | Stop reason: HOSPADM

## 2022-01-01 RX ORDER — ZINC SULFATE 50(220)MG
50 CAPSULE ORAL DAILY
Status: DISCONTINUED | OUTPATIENT
Start: 2022-01-01 | End: 2022-01-01 | Stop reason: HOSPADM

## 2022-01-01 RX ORDER — MAGNESIUM SULFATE 1 G/100ML
1000 INJECTION INTRAVENOUS ONCE
Status: COMPLETED | OUTPATIENT
Start: 2022-01-01 | End: 2022-01-01

## 2022-01-01 RX ORDER — ONDANSETRON 4 MG/1
4 TABLET, ORALLY DISINTEGRATING ORAL EVERY 8 HOURS PRN
Status: DISCONTINUED | OUTPATIENT
Start: 2022-01-01 | End: 2022-01-01

## 2022-01-01 RX ORDER — SODIUM CHLORIDE 0.9 % (FLUSH) 0.9 %
5-40 SYRINGE (ML) INJECTION EVERY 12 HOURS SCHEDULED
Status: DISCONTINUED | OUTPATIENT
Start: 2022-01-01 | End: 2022-01-01 | Stop reason: HOSPADM

## 2022-01-01 RX ORDER — ROSUVASTATIN CALCIUM 20 MG/1
20 TABLET, COATED ORAL NIGHTLY
COMMUNITY

## 2022-01-01 RX ORDER — PROPOFOL 10 MG/ML
5-50 INJECTION, EMULSION INTRAVENOUS CONTINUOUS
Status: DISCONTINUED | OUTPATIENT
Start: 2022-01-01 | End: 2022-01-01

## 2022-01-01 RX ORDER — ENOXAPARIN SODIUM 100 MG/ML
1 INJECTION SUBCUTANEOUS 2 TIMES DAILY
Status: DISCONTINUED | OUTPATIENT
Start: 2022-01-01 | End: 2022-01-01 | Stop reason: HOSPADM

## 2022-01-01 RX ORDER — ONDANSETRON 2 MG/ML
4 INJECTION INTRAMUSCULAR; INTRAVENOUS EVERY 6 HOURS PRN
Status: DISCONTINUED | OUTPATIENT
Start: 2022-01-01 | End: 2022-01-01

## 2022-01-01 RX ORDER — ASCORBIC ACID 500 MG
500 TABLET ORAL 2 TIMES DAILY
Status: DISCONTINUED | OUTPATIENT
Start: 2022-01-01 | End: 2022-01-01 | Stop reason: HOSPADM

## 2022-01-01 RX ORDER — SODIUM CHLORIDE 9 MG/ML
INJECTION, SOLUTION INTRAVENOUS CONTINUOUS
Status: DISCONTINUED | OUTPATIENT
Start: 2022-01-01 | End: 2022-01-01

## 2022-01-01 RX ORDER — ERGOCALCIFEROL 1.25 MG/1
50000 CAPSULE ORAL WEEKLY
Status: DISCONTINUED | OUTPATIENT
Start: 2022-01-01 | End: 2022-01-01 | Stop reason: HOSPADM

## 2022-01-01 RX ORDER — POLYETHYLENE GLYCOL 3350 17 G/17G
17 POWDER, FOR SOLUTION ORAL DAILY PRN
Status: DISCONTINUED | OUTPATIENT
Start: 2022-01-01 | End: 2022-01-01 | Stop reason: HOSPADM

## 2022-01-01 RX ORDER — MIRTAZAPINE 30 MG/1
45 TABLET, FILM COATED ORAL NIGHTLY
COMMUNITY

## 2022-01-01 RX ORDER — SODIUM CHLORIDE 0.9 % (FLUSH) 0.9 %
5-40 SYRINGE (ML) INJECTION PRN
Status: DISCONTINUED | OUTPATIENT
Start: 2022-01-01 | End: 2022-01-01 | Stop reason: SDUPTHER

## 2022-01-01 RX ORDER — LORAZEPAM 2 MG/ML
1 INJECTION INTRAMUSCULAR
Status: DISCONTINUED | OUTPATIENT
Start: 2022-01-01 | End: 2022-01-01 | Stop reason: HOSPADM

## 2022-01-01 RX ORDER — LIDOCAINE HYDROCHLORIDE 10 MG/ML
5 INJECTION, SOLUTION EPIDURAL; INFILTRATION; INTRACAUDAL; PERINEURAL ONCE
Status: DISCONTINUED | OUTPATIENT
Start: 2022-01-01 | End: 2022-01-01 | Stop reason: HOSPADM

## 2022-01-01 RX ORDER — FOLIC ACID 1 MG/1
1 TABLET ORAL DAILY
COMMUNITY

## 2022-01-01 RX ORDER — GUAIFENESIN 200 MG/1
400 TABLET ORAL EVERY 8 HOURS
Status: DISCONTINUED | OUTPATIENT
Start: 2022-01-01 | End: 2022-01-01 | Stop reason: HOSPADM

## 2022-01-01 RX ORDER — ACETAMINOPHEN 325 MG/1
650 TABLET ORAL EVERY 6 HOURS PRN
Status: DISCONTINUED | OUTPATIENT
Start: 2022-01-01 | End: 2022-01-01 | Stop reason: SDUPTHER

## 2022-01-01 RX ORDER — HYDROCODONE BITARTRATE AND ACETAMINOPHEN 7.5; 325 MG/1; MG/1
1 TABLET ORAL EVERY 6 HOURS PRN
COMMUNITY

## 2022-01-01 RX ORDER — SODIUM CHLORIDE 0.9 % (FLUSH) 0.9 %
5-40 SYRINGE (ML) INJECTION EVERY 12 HOURS SCHEDULED
Status: DISCONTINUED | OUTPATIENT
Start: 2022-01-01 | End: 2022-01-01 | Stop reason: SDUPTHER

## 2022-01-01 RX ORDER — ALBUTEROL SULFATE 90 UG/1
2 AEROSOL, METERED RESPIRATORY (INHALATION)
Status: DISCONTINUED | OUTPATIENT
Start: 2022-01-01 | End: 2022-01-01 | Stop reason: HOSPADM

## 2022-01-01 RX ORDER — SODIUM CHLORIDE 9 MG/ML
INJECTION, SOLUTION INTRAVENOUS PRN
Status: DISCONTINUED | OUTPATIENT
Start: 2022-01-01 | End: 2022-01-01 | Stop reason: HOSPADM

## 2022-01-01 RX ORDER — ACETAMINOPHEN 325 MG/1
650 TABLET ORAL EVERY 6 HOURS PRN
Status: DISCONTINUED | OUTPATIENT
Start: 2022-01-01 | End: 2022-01-01 | Stop reason: HOSPADM

## 2022-01-01 RX ORDER — PROPOFOL 10 MG/ML
5-50 INJECTION, EMULSION INTRAVENOUS CONTINUOUS
Status: DISCONTINUED | OUTPATIENT
Start: 2022-01-01 | End: 2022-01-01 | Stop reason: HOSPADM

## 2022-01-01 RX ORDER — MONTELUKAST SODIUM 10 MG/1
10 TABLET ORAL NIGHTLY
COMMUNITY

## 2022-01-01 RX ORDER — DEXMEDETOMIDINE HYDROCHLORIDE 4 UG/ML
.1-1.5 INJECTION, SOLUTION INTRAVENOUS CONTINUOUS
Status: DISCONTINUED | OUTPATIENT
Start: 2022-01-01 | End: 2022-01-01 | Stop reason: HOSPADM

## 2022-01-01 RX ORDER — DEXAMETHASONE SODIUM PHOSPHATE 10 MG/ML
6 INJECTION, SOLUTION INTRAMUSCULAR; INTRAVENOUS EVERY 12 HOURS
Status: DISCONTINUED | OUTPATIENT
Start: 2022-01-01 | End: 2022-01-01 | Stop reason: HOSPADM

## 2022-01-01 RX ORDER — ATORVASTATIN CALCIUM 40 MG/1
40 TABLET, FILM COATED ORAL NIGHTLY
Status: DISCONTINUED | OUTPATIENT
Start: 2022-01-01 | End: 2022-01-01 | Stop reason: HOSPADM

## 2022-01-01 RX ORDER — MELOXICAM 7.5 MG/1
7.5 TABLET ORAL DAILY
COMMUNITY

## 2022-01-01 RX ORDER — OSELTAMIVIR PHOSPHATE 6 MG/ML
30 FOR SUSPENSION ORAL 2 TIMES DAILY
Status: DISCONTINUED | OUTPATIENT
Start: 2022-01-01 | End: 2022-01-01 | Stop reason: HOSPADM

## 2022-01-01 RX ORDER — LISINOPRIL 40 MG/1
40 TABLET ORAL DAILY
COMMUNITY

## 2022-01-01 RX ORDER — VITAMIN B COMPLEX
2000 TABLET ORAL DAILY
Status: DISCONTINUED | OUTPATIENT
Start: 2022-01-01 | End: 2022-01-01 | Stop reason: HOSPADM

## 2022-01-01 RX ORDER — MEROPENEM AND SODIUM CHLORIDE 1 G/50ML
1000 INJECTION, SOLUTION INTRAVENOUS EVERY 12 HOURS
Status: DISCONTINUED | OUTPATIENT
Start: 2022-01-01 | End: 2022-01-01

## 2022-01-01 RX ADMIN — VANCOMYCIN HYDROCHLORIDE 2500 MG: 1 INJECTION, POWDER, LYOPHILIZED, FOR SOLUTION INTRAVENOUS at 07:03

## 2022-01-01 RX ADMIN — GUAIFENESIN 400 MG: 200 TABLET ORAL at 17:14

## 2022-01-01 RX ADMIN — LORAZEPAM 1 MG: 2 INJECTION INTRAMUSCULAR; INTRAVENOUS at 10:18

## 2022-01-01 RX ADMIN — ALBUTEROL SULFATE 2 PUFF: 90 AEROSOL, METERED RESPIRATORY (INHALATION) at 07:58

## 2022-01-01 RX ADMIN — SODIUM CHLORIDE, PRESERVATIVE FREE 10 ML: 5 INJECTION INTRAVENOUS at 21:34

## 2022-01-01 RX ADMIN — ALBUTEROL SULFATE 2 PUFF: 90 AEROSOL, METERED RESPIRATORY (INHALATION) at 08:55

## 2022-01-01 RX ADMIN — MEROPENEM AND SODIUM CHLORIDE 1000 MG: 1 INJECTION, SOLUTION INTRAVENOUS at 23:20

## 2022-01-01 RX ADMIN — LORAZEPAM 1 MG: 2 INJECTION INTRAMUSCULAR; INTRAVENOUS at 14:53

## 2022-01-01 RX ADMIN — PROPOFOL 10 MCG/KG/MIN: 10 INJECTION, EMULSION INTRAVENOUS at 02:35

## 2022-01-01 RX ADMIN — Medication 2.5 MCG/MIN: at 20:06

## 2022-01-01 RX ADMIN — MEROPENEM AND SODIUM CHLORIDE 1000 MG: 1 INJECTION, SOLUTION INTRAVENOUS at 10:24

## 2022-01-01 RX ADMIN — GUAIFENESIN 400 MG: 200 TABLET ORAL at 08:37

## 2022-01-01 RX ADMIN — GUAIFENESIN 400 MG: 200 TABLET ORAL at 17:29

## 2022-01-01 RX ADMIN — SODIUM CHLORIDE, PRESERVATIVE FREE 10 ML: 5 INJECTION INTRAVENOUS at 08:40

## 2022-01-01 RX ADMIN — LORAZEPAM 1 MG: 2 INJECTION INTRAMUSCULAR; INTRAVENOUS at 08:38

## 2022-01-01 RX ADMIN — FUROSEMIDE 20 MG: 10 INJECTION, SOLUTION INTRAMUSCULAR; INTRAVENOUS at 18:34

## 2022-01-01 RX ADMIN — OXYCODONE HYDROCHLORIDE AND ACETAMINOPHEN 500 MG: 500 TABLET ORAL at 20:29

## 2022-01-01 RX ADMIN — DEXAMETHASONE SODIUM PHOSPHATE 6 MG: 10 INJECTION, SOLUTION INTRAMUSCULAR; INTRAVENOUS at 07:47

## 2022-01-01 RX ADMIN — ENOXAPARIN SODIUM 80 MG: 100 INJECTION SUBCUTANEOUS at 19:57

## 2022-01-01 RX ADMIN — ALBUTEROL SULFATE 2 PUFF: 90 AEROSOL, METERED RESPIRATORY (INHALATION) at 20:16

## 2022-01-01 RX ADMIN — OXYCODONE HYDROCHLORIDE AND ACETAMINOPHEN 500 MG: 500 TABLET ORAL at 19:58

## 2022-01-01 RX ADMIN — IPRATROPIUM BROMIDE 2 PUFF: 17 AEROSOL, METERED RESPIRATORY (INHALATION) at 22:14

## 2022-01-01 RX ADMIN — MEROPENEM AND SODIUM CHLORIDE 1000 MG: 1 INJECTION, SOLUTION INTRAVENOUS at 21:50

## 2022-01-01 RX ADMIN — IPRATROPIUM BROMIDE 2 PUFF: 17 AEROSOL, METERED RESPIRATORY (INHALATION) at 06:18

## 2022-01-01 RX ADMIN — ATORVASTATIN CALCIUM 40 MG: 40 TABLET, FILM COATED ORAL at 20:15

## 2022-01-01 RX ADMIN — LORAZEPAM 1 MG: 2 INJECTION INTRAMUSCULAR; INTRAVENOUS at 13:13

## 2022-01-01 RX ADMIN — IPRATROPIUM BROMIDE 2 PUFF: 17 AEROSOL, METERED RESPIRATORY (INHALATION) at 17:42

## 2022-01-01 RX ADMIN — BARICITINIB 2 MG: 2 TABLET, FILM COATED ORAL at 07:51

## 2022-01-01 RX ADMIN — DEXAMETHASONE SODIUM PHOSPHATE 6 MG: 10 INJECTION, SOLUTION INTRAMUSCULAR; INTRAVENOUS at 21:32

## 2022-01-01 RX ADMIN — ALBUTEROL SULFATE 2 PUFF: 90 AEROSOL, METERED RESPIRATORY (INHALATION) at 21:00

## 2022-01-01 RX ADMIN — DEXAMETHASONE SODIUM PHOSPHATE 6 MG: 10 INJECTION, SOLUTION INTRAMUSCULAR; INTRAVENOUS at 07:53

## 2022-01-01 RX ADMIN — LORAZEPAM 1 MG: 2 INJECTION INTRAMUSCULAR; INTRAVENOUS at 09:02

## 2022-01-01 RX ADMIN — BARICITINIB 2 MG: 2 TABLET, FILM COATED ORAL at 08:37

## 2022-01-01 RX ADMIN — ZINC SULFATE 220 MG (50 MG) CAPSULE 50 MG: CAPSULE at 07:46

## 2022-01-01 RX ADMIN — SODIUM CHLORIDE: 9 INJECTION, SOLUTION INTRAVENOUS at 12:50

## 2022-01-01 RX ADMIN — CARVEDILOL 3.12 MG: 3.12 TABLET, FILM COATED ORAL at 09:37

## 2022-01-01 RX ADMIN — ENOXAPARIN SODIUM 80 MG: 100 INJECTION SUBCUTANEOUS at 08:36

## 2022-01-01 RX ADMIN — ZINC SULFATE 220 MG (50 MG) CAPSULE 50 MG: CAPSULE at 07:52

## 2022-01-01 RX ADMIN — GUAIFENESIN 400 MG: 200 TABLET ORAL at 00:19

## 2022-01-01 RX ADMIN — ERGOCALCIFEROL 50000 UNITS: 1.25 CAPSULE ORAL at 08:37

## 2022-01-01 RX ADMIN — OXYCODONE HYDROCHLORIDE AND ACETAMINOPHEN 500 MG: 500 TABLET ORAL at 21:32

## 2022-01-01 RX ADMIN — LORAZEPAM 1 MG: 2 INJECTION INTRAMUSCULAR; INTRAVENOUS at 17:14

## 2022-01-01 RX ADMIN — Medication 2000 UNITS: at 08:37

## 2022-01-01 RX ADMIN — LORAZEPAM 1 MG: 2 INJECTION INTRAMUSCULAR; INTRAVENOUS at 18:28

## 2022-01-01 RX ADMIN — ALBUTEROL SULFATE 2 PUFF: 90 AEROSOL, METERED RESPIRATORY (INHALATION) at 07:50

## 2022-01-01 RX ADMIN — MEROPENEM AND SODIUM CHLORIDE 1000 MG: 1 INJECTION, SOLUTION INTRAVENOUS at 04:44

## 2022-01-01 RX ADMIN — OSELTAMIVIR PHOSPHATE 30 MG: 6 POWDER, FOR SUSPENSION ORAL at 21:34

## 2022-01-01 RX ADMIN — IPRATROPIUM BROMIDE 2 PUFF: 17 AEROSOL, METERED RESPIRATORY (INHALATION) at 15:54

## 2022-01-01 RX ADMIN — IPRATROPIUM BROMIDE 2 PUFF: 17 AEROSOL, METERED RESPIRATORY (INHALATION) at 09:02

## 2022-01-01 RX ADMIN — OSELTAMIVIR PHOSPHATE 30 MG: 6 POWDER, FOR SUSPENSION ORAL at 22:11

## 2022-01-01 RX ADMIN — PERFLUTREN 1.5 ML: 6.52 INJECTION, SUSPENSION INTRAVENOUS at 16:44

## 2022-01-01 RX ADMIN — IPRATROPIUM BROMIDE 2 PUFF: 17 AEROSOL, METERED RESPIRATORY (INHALATION) at 14:11

## 2022-01-01 RX ADMIN — ALBUTEROL SULFATE 2 PUFF: 90 AEROSOL, METERED RESPIRATORY (INHALATION) at 11:41

## 2022-01-01 RX ADMIN — ENOXAPARIN SODIUM 80 MG: 100 INJECTION SUBCUTANEOUS at 20:29

## 2022-01-01 RX ADMIN — GUAIFENESIN 400 MG: 200 TABLET ORAL at 07:51

## 2022-01-01 RX ADMIN — OXYCODONE HYDROCHLORIDE AND ACETAMINOPHEN 500 MG: 500 TABLET ORAL at 08:52

## 2022-01-01 RX ADMIN — IPRATROPIUM BROMIDE 2 PUFF: 17 AEROSOL, METERED RESPIRATORY (INHALATION) at 13:59

## 2022-01-01 RX ADMIN — OSELTAMIVIR PHOSPHATE 75 MG: 6 POWDER, FOR SUSPENSION ORAL at 05:58

## 2022-01-01 RX ADMIN — VANCOMYCIN HYDROCHLORIDE 750 MG: 750 INJECTION, POWDER, LYOPHILIZED, FOR SOLUTION INTRAVENOUS at 18:20

## 2022-01-01 RX ADMIN — GUAIFENESIN 400 MG: 200 TABLET ORAL at 07:46

## 2022-01-01 RX ADMIN — BARICITINIB 2 MG: 2 TABLET, FILM COATED ORAL at 10:18

## 2022-01-01 RX ADMIN — SODIUM CHLORIDE, PRESERVATIVE FREE 40 MG: 5 INJECTION INTRAVENOUS at 07:55

## 2022-01-01 RX ADMIN — ALBUTEROL SULFATE 2 PUFF: 90 AEROSOL, METERED RESPIRATORY (INHALATION) at 16:31

## 2022-01-01 RX ADMIN — PROPOFOL 10 MCG/KG/MIN: 10 INJECTION, EMULSION INTRAVENOUS at 09:14

## 2022-01-01 RX ADMIN — DEXAMETHASONE SODIUM PHOSPHATE 6 MG: 10 INJECTION, SOLUTION INTRAMUSCULAR; INTRAVENOUS at 20:15

## 2022-01-01 RX ADMIN — BARICITINIB 2 MG: 2 TABLET, FILM COATED ORAL at 07:46

## 2022-01-01 RX ADMIN — Medication 1 MG: at 08:23

## 2022-01-01 RX ADMIN — IPRATROPIUM BROMIDE 2 PUFF: 17 AEROSOL, METERED RESPIRATORY (INHALATION) at 08:56

## 2022-01-01 RX ADMIN — SODIUM CHLORIDE, PRESERVATIVE FREE 40 MG: 5 INJECTION INTRAVENOUS at 08:37

## 2022-01-01 RX ADMIN — IPRATROPIUM BROMIDE 2 PUFF: 17 AEROSOL, METERED RESPIRATORY (INHALATION) at 08:36

## 2022-01-01 RX ADMIN — Medication 2000 UNITS: at 07:51

## 2022-01-01 RX ADMIN — LORAZEPAM 1 MG: 2 INJECTION INTRAMUSCULAR; INTRAVENOUS at 10:30

## 2022-01-01 RX ADMIN — IPRATROPIUM BROMIDE 2 PUFF: 17 AEROSOL, METERED RESPIRATORY (INHALATION) at 11:01

## 2022-01-01 RX ADMIN — Medication 2000 UNITS: at 07:46

## 2022-01-01 RX ADMIN — OSELTAMIVIR PHOSPHATE 75 MG: 6 POWDER, FOR SUSPENSION ORAL at 20:16

## 2022-01-01 RX ADMIN — ALBUTEROL SULFATE 2 PUFF: 90 AEROSOL, METERED RESPIRATORY (INHALATION) at 12:31

## 2022-01-01 RX ADMIN — ENOXAPARIN SODIUM 80 MG: 100 INJECTION SUBCUTANEOUS at 07:52

## 2022-01-01 RX ADMIN — OXYCODONE HYDROCHLORIDE AND ACETAMINOPHEN 500 MG: 500 TABLET ORAL at 08:37

## 2022-01-01 RX ADMIN — MAGNESIUM SULFATE HEPTAHYDRATE 1000 MG: 1 INJECTION, SOLUTION INTRAVENOUS at 09:42

## 2022-01-01 RX ADMIN — MEROPENEM AND SODIUM CHLORIDE 1000 MG: 1 INJECTION, SOLUTION INTRAVENOUS at 11:00

## 2022-01-01 RX ADMIN — ATORVASTATIN CALCIUM 40 MG: 40 TABLET, FILM COATED ORAL at 20:29

## 2022-01-01 RX ADMIN — PROPOFOL 5 MCG/KG/MIN: 10 INJECTION, EMULSION INTRAVENOUS at 05:48

## 2022-01-01 RX ADMIN — LORAZEPAM 1 MG: 2 INJECTION INTRAMUSCULAR; INTRAVENOUS at 12:30

## 2022-01-01 RX ADMIN — OXYCODONE HYDROCHLORIDE AND ACETAMINOPHEN 500 MG: 500 TABLET ORAL at 07:50

## 2022-01-01 RX ADMIN — ERGOCALCIFEROL 50000 UNITS: 1.25 CAPSULE ORAL at 08:51

## 2022-01-01 RX ADMIN — Medication 1 MG: at 08:17

## 2022-01-01 RX ADMIN — ALBUTEROL SULFATE 2 PUFF: 90 AEROSOL, METERED RESPIRATORY (INHALATION) at 12:23

## 2022-01-01 RX ADMIN — Medication 1 MG: at 08:26

## 2022-01-01 RX ADMIN — Medication 10 MG: at 20:29

## 2022-01-01 RX ADMIN — OXYCODONE HYDROCHLORIDE AND ACETAMINOPHEN 500 MG: 500 TABLET ORAL at 07:45

## 2022-01-01 RX ADMIN — ENOXAPARIN SODIUM 30 MG: 100 INJECTION SUBCUTANEOUS at 08:50

## 2022-01-01 RX ADMIN — CARVEDILOL 3.12 MG: 3.12 TABLET, FILM COATED ORAL at 16:28

## 2022-01-01 RX ADMIN — OSELTAMIVIR PHOSPHATE 75 MG: 6 POWDER, FOR SUSPENSION ORAL at 08:36

## 2022-01-01 RX ADMIN — ATORVASTATIN CALCIUM 40 MG: 40 TABLET, FILM COATED ORAL at 19:58

## 2022-01-01 RX ADMIN — PROPOFOL 8 MCG/KG/MIN: 10 INJECTION, EMULSION INTRAVENOUS at 05:11

## 2022-01-01 RX ADMIN — IPRATROPIUM BROMIDE 2 PUFF: 17 AEROSOL, METERED RESPIRATORY (INHALATION) at 22:30

## 2022-01-01 RX ADMIN — GUAIFENESIN 400 MG: 200 TABLET ORAL at 02:30

## 2022-01-01 RX ADMIN — Medication 10 MG: at 20:15

## 2022-01-01 RX ADMIN — PROPOFOL 15 MCG/KG/MIN: 10 INJECTION, EMULSION INTRAVENOUS at 02:12

## 2022-01-01 RX ADMIN — ALBUTEROL SULFATE 2 PUFF: 90 AEROSOL, METERED RESPIRATORY (INHALATION) at 20:29

## 2022-01-01 RX ADMIN — DEXAMETHASONE SODIUM PHOSPHATE 6 MG: 10 INJECTION, SOLUTION INTRAMUSCULAR; INTRAVENOUS at 08:54

## 2022-01-01 RX ADMIN — OSELTAMIVIR PHOSPHATE 30 MG: 6 POWDER, FOR SUSPENSION ORAL at 20:31

## 2022-01-01 RX ADMIN — Medication 50 MCG/HR: at 04:15

## 2022-01-01 RX ADMIN — IPRATROPIUM BROMIDE 2 PUFF: 17 AEROSOL, METERED RESPIRATORY (INHALATION) at 22:12

## 2022-01-01 RX ADMIN — DEXAMETHASONE SODIUM PHOSPHATE 6 MG: 10 INJECTION, SOLUTION INTRAMUSCULAR; INTRAVENOUS at 08:37

## 2022-01-01 RX ADMIN — IPRATROPIUM BROMIDE 2 PUFF: 17 AEROSOL, METERED RESPIRATORY (INHALATION) at 14:34

## 2022-01-01 RX ADMIN — Medication 10 MG: at 21:32

## 2022-01-01 RX ADMIN — IPRATROPIUM BROMIDE 2 PUFF: 17 AEROSOL, METERED RESPIRATORY (INHALATION) at 05:59

## 2022-01-01 RX ADMIN — SODIUM CHLORIDE, PRESERVATIVE FREE 10 ML: 5 INJECTION INTRAVENOUS at 07:58

## 2022-01-01 RX ADMIN — GUAIFENESIN 400 MG: 200 TABLET ORAL at 01:54

## 2022-01-01 RX ADMIN — VANCOMYCIN HYDROCHLORIDE 750 MG: 750 INJECTION, POWDER, LYOPHILIZED, FOR SOLUTION INTRAVENOUS at 06:39

## 2022-01-01 RX ADMIN — Medication 50 MCG/HR: at 05:19

## 2022-01-01 RX ADMIN — MEROPENEM AND SODIUM CHLORIDE 1000 MG: 1 INJECTION, SOLUTION INTRAVENOUS at 09:16

## 2022-01-01 RX ADMIN — MEROPENEM AND SODIUM CHLORIDE 1000 MG: 1 INJECTION, SOLUTION INTRAVENOUS at 02:01

## 2022-01-01 RX ADMIN — ALBUTEROL SULFATE 2 PUFF: 90 AEROSOL, METERED RESPIRATORY (INHALATION) at 15:55

## 2022-01-01 RX ADMIN — Medication 10 MG: at 19:58

## 2022-01-01 RX ADMIN — ALBUTEROL SULFATE 2 PUFF: 90 AEROSOL, METERED RESPIRATORY (INHALATION) at 13:58

## 2022-01-01 RX ADMIN — Medication 1 MG: at 08:29

## 2022-01-01 RX ADMIN — SODIUM CHLORIDE 200 ML/HR: 9 INJECTION, SOLUTION INTRAVENOUS at 02:05

## 2022-01-01 RX ADMIN — LORAZEPAM 1 MG: 2 INJECTION INTRAMUSCULAR; INTRAVENOUS at 12:21

## 2022-01-01 RX ADMIN — IPRATROPIUM BROMIDE 2 PUFF: 17 AEROSOL, METERED RESPIRATORY (INHALATION) at 17:37

## 2022-01-01 RX ADMIN — OXYCODONE HYDROCHLORIDE AND ACETAMINOPHEN 500 MG: 500 TABLET ORAL at 20:15

## 2022-01-01 RX ADMIN — PROPOFOL 10 MCG/KG/MIN: 10 INJECTION, EMULSION INTRAVENOUS at 12:52

## 2022-01-01 RX ADMIN — Medication 25 MCG/HR: at 02:01

## 2022-01-01 RX ADMIN — Medication 5 MCG/MIN: at 02:04

## 2022-01-01 RX ADMIN — ALBUTEROL SULFATE 2 PUFF: 90 AEROSOL, METERED RESPIRATORY (INHALATION) at 19:59

## 2022-01-01 RX ADMIN — PROPOFOL 10 MCG/KG/MIN: 10 INJECTION, EMULSION INTRAVENOUS at 20:32

## 2022-01-01 RX ADMIN — IPRATROPIUM BROMIDE 2 PUFF: 17 AEROSOL, METERED RESPIRATORY (INHALATION) at 10:17

## 2022-01-01 RX ADMIN — IPRATROPIUM BROMIDE 2 PUFF: 17 AEROSOL, METERED RESPIRATORY (INHALATION) at 06:28

## 2022-01-01 RX ADMIN — Medication 1 MG: at 08:21

## 2022-01-01 RX ADMIN — ENOXAPARIN SODIUM 80 MG: 100 INJECTION SUBCUTANEOUS at 21:32

## 2022-01-01 RX ADMIN — SODIUM CHLORIDE, PRESERVATIVE FREE 10 ML: 5 INJECTION INTRAVENOUS at 19:58

## 2022-01-01 RX ADMIN — DEXAMETHASONE SODIUM PHOSPHATE 6 MG: 10 INJECTION, SOLUTION INTRAMUSCULAR; INTRAVENOUS at 20:29

## 2022-01-01 RX ADMIN — SODIUM CHLORIDE, PRESERVATIVE FREE 10 ML: 5 INJECTION INTRAVENOUS at 07:49

## 2022-01-01 RX ADMIN — ZINC SULFATE 220 MG (50 MG) CAPSULE 50 MG: CAPSULE at 08:38

## 2022-01-01 RX ADMIN — IPRATROPIUM BROMIDE 2 PUFF: 17 AEROSOL, METERED RESPIRATORY (INHALATION) at 21:50

## 2022-01-01 RX ADMIN — ALBUTEROL SULFATE 2 PUFF: 90 AEROSOL, METERED RESPIRATORY (INHALATION) at 08:35

## 2022-01-01 RX ADMIN — GUAIFENESIN 400 MG: 200 TABLET ORAL at 08:52

## 2022-01-01 RX ADMIN — ENOXAPARIN SODIUM 80 MG: 100 INJECTION SUBCUTANEOUS at 20:15

## 2022-01-01 RX ADMIN — SODIUM CHLORIDE, PRESERVATIVE FREE 10 ML: 5 INJECTION INTRAVENOUS at 20:15

## 2022-01-01 RX ADMIN — OSELTAMIVIR PHOSPHATE 30 MG: 6 POWDER, FOR SUSPENSION ORAL at 07:49

## 2022-01-01 RX ADMIN — ALBUTEROL SULFATE 2 PUFF: 90 AEROSOL, METERED RESPIRATORY (INHALATION) at 16:29

## 2022-01-01 RX ADMIN — MEROPENEM AND SODIUM CHLORIDE 1000 MG: 1 INJECTION, SOLUTION INTRAVENOUS at 20:03

## 2022-01-01 RX ADMIN — SODIUM CHLORIDE, PRESERVATIVE FREE 10 ML: 5 INJECTION INTRAVENOUS at 08:49

## 2022-01-01 RX ADMIN — LORAZEPAM 1 MG: 2 INJECTION INTRAMUSCULAR; INTRAVENOUS at 11:39

## 2022-01-01 RX ADMIN — SODIUM CHLORIDE, PRESERVATIVE FREE 40 MG: 5 INJECTION INTRAVENOUS at 07:46

## 2022-01-01 RX ADMIN — ZINC SULFATE 220 MG (50 MG) CAPSULE 50 MG: CAPSULE at 08:53

## 2022-01-01 RX ADMIN — OSELTAMIVIR PHOSPHATE 30 MG: 6 POWDER, FOR SUSPENSION ORAL at 07:52

## 2022-01-01 RX ADMIN — Medication 2000 UNITS: at 08:50

## 2022-01-01 RX ADMIN — PROPOFOL 15 MCG/KG/MIN: 10 INJECTION, EMULSION INTRAVENOUS at 11:20

## 2022-01-01 RX ADMIN — DEXAMETHASONE SODIUM PHOSPHATE 6 MG: 10 INJECTION, SOLUTION INTRAMUSCULAR; INTRAVENOUS at 19:57

## 2022-01-01 RX ADMIN — GUAIFENESIN 400 MG: 200 TABLET ORAL at 17:42

## 2022-01-01 RX ADMIN — IPRATROPIUM BROMIDE 2 PUFF: 17 AEROSOL, METERED RESPIRATORY (INHALATION) at 17:15

## 2022-01-01 RX ADMIN — MEROPENEM AND SODIUM CHLORIDE 1000 MG: 1 INJECTION, SOLUTION INTRAVENOUS at 09:59

## 2022-01-01 RX ADMIN — MEROPENEM AND SODIUM CHLORIDE 1000 MG: 1 INJECTION, SOLUTION INTRAVENOUS at 17:36

## 2022-01-01 RX ADMIN — SODIUM CHLORIDE, PRESERVATIVE FREE 40 MG: 5 INJECTION INTRAVENOUS at 08:54

## 2022-01-01 RX ADMIN — ATORVASTATIN CALCIUM 40 MG: 40 TABLET, FILM COATED ORAL at 21:32

## 2022-01-01 RX ADMIN — DEXMEDETOMIDINE HYDROCHLORIDE 0.2 MCG/KG/HR: 4 INJECTION, SOLUTION INTRAVENOUS at 03:04

## 2022-01-01 RX ADMIN — GUAIFENESIN 400 MG: 200 TABLET ORAL at 02:02

## 2022-01-01 RX ADMIN — GUAIFENESIN 400 MG: 200 TABLET ORAL at 16:29

## 2022-01-01 RX ADMIN — SODIUM BICARBONATE 50 MEQ: 84 INJECTION, SOLUTION INTRAVENOUS at 08:17

## 2022-01-01 RX ADMIN — SODIUM CHLORIDE, PRESERVATIVE FREE 10 ML: 5 INJECTION INTRAVENOUS at 20:29

## 2022-01-01 ASSESSMENT — PULMONARY FUNCTION TESTS
PIF_VALUE: 31
PIF_VALUE: 31
PIF_VALUE: 37
PIF_VALUE: 31
PIF_VALUE: 25
PIF_VALUE: 24
PIF_VALUE: 33
PIF_VALUE: 28
PIF_VALUE: 35
PIF_VALUE: 27
PIF_VALUE: 33
PIF_VALUE: 34
PIF_VALUE: 33
PIF_VALUE: 31
PIF_VALUE: 34
PIF_VALUE: 34
PIF_VALUE: 30
PIF_VALUE: 35
PIF_VALUE: 34
PIF_VALUE: 26
PIF_VALUE: 31
PIF_VALUE: 38
PIF_VALUE: 29
PIF_VALUE: 31
PIF_VALUE: 33
PIF_VALUE: 32
PIF_VALUE: 39
PIF_VALUE: 34
PIF_VALUE: 33
PIF_VALUE: 33
PIF_VALUE: 35
PIF_VALUE: 32
PIF_VALUE: 35
PIF_VALUE: 30
PIF_VALUE: 33
PIF_VALUE: 34
PIF_VALUE: 32
PIF_VALUE: 32
PIF_VALUE: 38
PIF_VALUE: 35
PIF_VALUE: 34
PIF_VALUE: 34
PIF_VALUE: 33
PIF_VALUE: 27
PIF_VALUE: 38
PIF_VALUE: 32
PIF_VALUE: 34
PIF_VALUE: 32
PIF_VALUE: 30
PIF_VALUE: 33
PIF_VALUE: 37
PIF_VALUE: 33
PIF_VALUE: 34
PIF_VALUE: 31
PIF_VALUE: 30
PIF_VALUE: 32
PIF_VALUE: 34
PIF_VALUE: 35
PIF_VALUE: 23
PIF_VALUE: 29
PIF_VALUE: 33
PIF_VALUE: 33
PIF_VALUE: 30
PIF_VALUE: 25
PIF_VALUE: 26
PIF_VALUE: 31
PIF_VALUE: 34
PIF_VALUE: 34
PIF_VALUE: 25
PIF_VALUE: 33
PIF_VALUE: 34
PIF_VALUE: 33
PIF_VALUE: 21
PIF_VALUE: 32
PIF_VALUE: 33
PIF_VALUE: 30
PIF_VALUE: 30
PIF_VALUE: 34
PIF_VALUE: 31
PIF_VALUE: 31
PIF_VALUE: 36
PIF_VALUE: 31
PIF_VALUE: 26
PIF_VALUE: 35
PIF_VALUE: 35
PIF_VALUE: 36
PIF_VALUE: 31
PIF_VALUE: 35
PIF_VALUE: 25
PIF_VALUE: 33
PIF_VALUE: 25
PIF_VALUE: 32
PIF_VALUE: 32
PIF_VALUE: 30
PIF_VALUE: 34
PIF_VALUE: 23
PIF_VALUE: 21
PIF_VALUE: 30
PIF_VALUE: 34
PIF_VALUE: 29
PIF_VALUE: 29
PIF_VALUE: 35
PIF_VALUE: 34
PIF_VALUE: 33
PIF_VALUE: 30
PIF_VALUE: 31
PIF_VALUE: 31
PIF_VALUE: 36
PIF_VALUE: 34
PIF_VALUE: 35
PIF_VALUE: 34
PIF_VALUE: 24
PIF_VALUE: 34
PIF_VALUE: 33
PIF_VALUE: 34
PIF_VALUE: 24
PIF_VALUE: 41
PIF_VALUE: 32
PIF_VALUE: 25
PIF_VALUE: 32
PIF_VALUE: 29
PIF_VALUE: 34
PIF_VALUE: 33
PIF_VALUE: 33

## 2022-01-01 ASSESSMENT — ENCOUNTER SYMPTOMS: SHORTNESS OF BREATH: 1

## 2022-12-13 PROBLEM — J96.90 RESPIRATORY FAILURE (HCC): Status: ACTIVE | Noted: 2022-01-01

## 2022-12-13 PROBLEM — I21.4 NON-STEMI (NON-ST ELEVATED MYOCARDIAL INFARCTION) (HCC): Status: ACTIVE | Noted: 2022-01-01

## 2022-12-13 PROBLEM — Z51.5 PALLIATIVE CARE PATIENT: Status: ACTIVE | Noted: 2022-01-01

## 2022-12-13 PROBLEM — I50.21 ACUTE SYSTOLIC HEART FAILURE (HCC): Status: ACTIVE | Noted: 2022-01-01

## 2022-12-13 NOTE — CONSULTS
Palliative Care:    Pt on vent, dtr present and able to speak with SN. Pt was transferred here from other facility already on vent. Dtr states pt was struggling for air at other facility and when asked if wanted on vent, pt shook head no but dtr had to make decision and chose to have pt on vent to ease her struggle. Dtr states pt has been on vent before and was on for 14 days then pulled vent out herself. Dtr is main caregiver and states she has a brother but her brother does not approve of pt's lifestyle and has said he will not be present when pt's condition declines. Past Medical History:        Past Medical History:   Diagnosis Date    Abdominal hernia     COPD (chronic obstructive pulmonary disease) ([facility] [place] [address]     Gastritis     Hyperlipidemia     Hypertension     Pneumonia     Streptococcal pharyngitis     UTI (urinary tract infection)        Advance Directives:   Dtr states pt has a living will/POA stating she is POA and to make decisions on behalf of the pt. Dtr to bring in copy tomorrow. Dtr states she knows pt does not want to have a trach, and she believes her wishes were to not be on vent, but not sure. Patient/family discussion r/t goals: Dtr states she knows pt does not want to be on the vent longterm and if can not be weaned then may want to seek hospice services. Dtr wants to wait a few days and see if pt improves any before making that decision.                  Electronically signed by Natividad Islas RN on 12/13/2022 at 11:26 AM

## 2022-12-13 NOTE — PLAN OF CARE
Problem: Discharge Planning  Goal: Discharge to home or other facility with appropriate resources  12/13/2022 1647 by Shahida Syed RN  Outcome: Not Progressing  12/13/2022 0543 by Andrzej Blackwell RN  Outcome: Progressing     Problem: Discharge Planning  Goal: Discharge to home or other facility with appropriate resources  12/13/2022 1647 by Shahida Syed RN  Outcome: Not Progressing  12/13/2022 0543 by Andrzej Blackwell RN  Outcome: Progressing

## 2022-12-13 NOTE — PROGRESS NOTES
Pharmacy Adjustment per [facility] protocol    Sheila Sterling is a 81 y.o. female. Pharmacy has adjusted medications per [facility] protocol. Recent Labs     12/13/22 0215   BUN 24*       Recent Labs     12/13/22 0215   CREATININE 1.1*       Estimated Creatinine Clearance: 59 mL/min (A) (based on SCr of 1.1 mg/dL (H)).     Height:   Ht Readings from Last 1 Encounters:   12/13/22 5' 7\" (1.702 m)     Weight:  Wt Readings from Last 1 Encounters:   12/13/22 230 lb 2.6 oz (104.4 kg)         Plan: Adjust the following medications based on [facility] protocol:           Lovenox 30 mg sq bid    Electronically signed by Lia Garcia, [facility] on 12/13/2022 at 4:45 AM

## 2022-12-13 NOTE — PROGRESS NOTES
4 Eyes Skin Assessment    Jolie Morgan is being assessed upon: Admission    I agree that Alyson Portillo, RN, along with Aidan Hanks RN (either 2 RN's or 1 LPN and 1 RN) have performed a thorough Head to Toe Skin Assessment on the patient. ALL assessment sites listed below have been assessed. Areas assessed by both nurses:     [x]   Head, Face, and Ears   [x]   Shoulders, Back, and Chest  [x]   Arms, Elbows, and Hands   [x]   Coccyx, Sacrum, and Ischium  [x]   Legs, Feet, and Heels    Does the Patient have Skin Breakdown?  No    Jd Prevention initiated: Yes  Wound Care Orders initiated: NA    [facility] nurse consulted for Pressure Injury (Stage 3,4, Unstageable, DTI, NWPT, and Complex wounds) and New or Established Ostomies: NA        Primary Nurse eSignature: Sameer Graham RN on 12/13/2022 at 3:03 AM      Co-Signer eSignature: Electronically signed by Aidan Hanks RN on 12/13/22 at 3:12 AM CST

## 2022-12-13 NOTE — CONSULTS
Comprehensive Nutrition Assessment    Type and Reason for Visit:  Initial, Consult (new vent)    Nutrition Recommendations/Plan:   Initiate EN: Jevity 1.5 @ 15 mL/hr. Flush tube with Proteinex TID. Flush tube with 30 mL/H2O every hr.      Malnutrition Assessment:  Malnutrition Status: At risk for malnutrition (Comment) (12/13/22 1017)    Context:  Acute Illness     Findings of the 6 clinical characteristics of malnutrition:  Energy Intake:  Mild decrease in energy intake (Comment)  Weight Loss:  No significant weight loss     Body Fat Loss:  Unable to assess     Muscle Mass Loss:  Unable to assess    Fluid Accumulation:  No significant fluid accumulation     Strength:  Not Performed    Nutrition Assessment:    Pt is nutritionally compromised AEB inadequate nutritional intake r/t mechanical ventilation. Propofol currently provides about 133 non-protein kcals/day. Pt is on Decadron and IV Dextrose with ATB. Monitor blood glucose levels closely. Will initiate EN at a very slow rate, monitor tolerance, and increase as able to meet nutritional needs.     Current Nutrition Intake & Therapies:    Average Meal Intake: NPO  Diet NPO Exceptions are: Sips of Water with Meds    Anthropometric Measures:  Height: 5' 7\" (170.2 cm)  Ideal Body Weight (IBW): 135 lbs (61 kg)    Current Body Weight: 230 lb (104.3 kg)  Current BMI (kg/m2): 36  BMI Categories: Obese Class 2 (BMI 35.0 -39.9)    Estimated Daily Nutrient Needs:  Energy Requirements Based On: Kcal/kg  Weight Used for Energy Requirements: Current  Energy (kcal/day): 3040-3339 kcals/day (11-14)  Weight Used for Protein Requirements: Ideal  Protein (g/day): 122 g/protein/day (2.0)  Method Used for Fluid Requirements: 1 ml/kcal  Fluid (ml/day): 5178-9009 mL/day    Nutrition Diagnosis:   Inadequate oral intake related to impaired respiratory function as evidenced by NPO or clear liquid status due to medical condition, intubation    Nutrition Interventions:   Food and/or Nutrient Delivery: Continue NPO, Start Tube Feeding  Coordination of Nutrition Care: Continue to monitor while inpatient    Goals:  Goals: Initiate nutrition support    Nutrition Monitoring and Evaluation:   Food/Nutrient Intake Outcomes: Enteral Nutrition Intake/Tolerance  Physical Signs/Symptoms Outcomes: Biochemical Data, Nutrition Focused Physical Findings, Weight    Mica Garcia MS, RD, LD  Contact: [phone]

## 2022-12-13 NOTE — CONSULTS
Pulmonary and Critical Care Consult Note    [facility] Luca Sibley    MRN# [id number]    Acct# [[id number]]  12/13/2022   7:27 AM CST    Referring Park Eagle MD      Chief Complaint: resp failure on the vent. Requesting physician: Dr. Jasmeet Howard . Reason for consult: Resp failure on vent. HPI: We have been consulted to see this 81y.o. year old female born on 1950. The patient is intubated on mechanical ventilation sedated. The patient was transferred to this facility for higher level of care. She was intubated secondary to respiratory failure. She was also on pressors to treat shock felt to be secondary to sepsis. The patient had an upper respiratory panel at was positive for COVID and RSV and influenza A. Currently the patient is hemodynamically stable. Continues to require some pressors. She did have elevated troponins cardiology consulted. Chest x-ray showed endotracheal tube in adequate position with what seems to be right lower lobe density.   Thick acid was elevated      Past Medical History      Past Medical History:   Diagnosis Date    Abdominal hernia     COPD (chronic obstructive pulmonary disease) (HCC)     Gastritis     Hyperlipidemia     Hypertension     Pneumonia     Streptococcal pharyngitis     UTI (urinary tract infection)      SurgicalHistory  Past Surgical History:   Procedure Laterality Date    CHOLECYSTECTOMY      GASTRIC BYPASS SURGERY      HYSTERECTOMY (CERVIX STATUS UNKNOWN)      JOINT REPLACEMENT Bilateral     knee     Allergies  Allergies   Allergen Reactions    Ampicillin     Aspirin     Ciprofloxacin     Contrast [Iodides]     Keflex [Cephalexin]     Penicillins     Sulfa Antibiotics     Tetanus Toxoids     Tetracyclines & Related      Medications    sodium chloride flush, 5-40 mL, IntraVENous, 2 times per day    enoxaparin, 30 mg, SubCUTAneous, BID    vitamin D, 50,000 Units, Oral, Weekly    oseltamivir 6mg/ml, 75 mg, Oral, BID    pantoprazole (PROTONIX) 40 mg injection, 40 mg, IntraVENous, Daily    vancomycin, 750 mg, IntraVENous, Q12H    vancomycin (VANCOCIN) intermittent dosing (placeholder), , Other, RX Placeholder    albuterol sulfate HFA, 2 puff, Inhalation, Q4H WA    ipratropium, 2 puff, Inhalation, Q4H While awake    dexamethasone, 6 mg, IntraVENous, Q12H    guaiFENesin, 400 mg, Oral, Q8H    melatonin, 10 mg, Oral, Nightly    vitamin C, 500 mg, Oral, BID    Vitamin D, 2,000 Units, Oral, Daily    zinc sulfate, 50 mg, Oral, Daily    meropenem, 1,000 mg, IntraVENous, Q8H    baricitinib, 2 mg, Oral, Daily   Social History   reports that she has been smoking cigarettes. She has a 40.00 pack-year smoking history. She has never used smokeless tobacco.  Family History  family history is not on file.     Review of Systems:  12 point review of systems is negative except as below:  Review of systems not obtained due to patient factors - intubation    Physical Exam:  /70   Pulse 58   Temp 98.9 °F (37.2 °C) (Temporal)   Resp 20   Ht 5' 7\" (1.702 m)   Wt 230 lb 2.6 oz (104.4 kg)   SpO2 96%   BMI 36.05 kg/m²   Intake/Output Summary (Last 24 hours) at 12/13/2022 1242  Last data filed at 12/13/2022 1375  Gross per 24 hour   Intake 721.85 ml   Output --   Net 721.85 ml       General appearance: Elderly obese female who sedated intubated on mechanical ventilation  HEENT: Normocephalic atraumatic endotracheal tube in place  Heart: S1-S2 distant sounds no murmurs  Lungs: Diminished bilaterally no rubs or tenderness on dullness to percussion  Abdomen: Soft nontender no organomegalies normal bowel sounds  Extremities: No clubbing cyanosis or edema  Neuro: Intubated on mechanical ventilation  Skin: Intact        Labs:  Recent Labs     12/13/22  0215   WBC 7.3   RBC 4.25   HGB 12.1   HCT 37.6      MCV 88.5   MCH 28.5   MCHC 32.2*   RDW 13.1      Recent Labs     12/13/22 0215 12/13/22 0224     --    K 4.4 4.2     --    CO2 25  --    BUN 24*  --    CREATININE 1.1*  --    CALCIUM 8.6*  --    GLUCOSE 136*  --       Recent Labs     12/13/22 0224   PHART 7.370   IBO5KZE 49.0*   PO2ART 77.0*   CFC8QYG 28.3*   J3QZSPHJ 94.7   BEART 2.3*     Recent Labs     12/13/22 0215 12/13/22 0230 12/13/22  0749   AST 1,049*  --   --    *  --   --    ALKPHOS 167*  --   --    BILITOT 1.5*  --   --    MG 2.0  --   --    CALCIUM 8.6*  --   --    PHOS 4.1  --   --    PROBNP 9,301*  --   --    TROPONINI 0.52*  --  0.38*   LACTA  --  2.2*  --    TSH 0.312  --   --      No results for input(s): BC, LABGRAM, CULTRESP, BFCX in the last 72 hours. Radiograph: XR CHEST PORTABLE    Result Date: 12/13/2022  Peripheral consolidation in the right lung base is matched by patchy opacity in the medial left lung base. Electronically signed by Shauna Haque MD on 12-13-22 at 6456       My radiograph interpretation/independent review of imaging: Chest x-ray reviewed    Problem list generated by [facility] Problems             Last Modified POA    * (Principal) Respiratory failure ([address] 12/13/2022 Yes          Pulmonary Assessment/plan:    Acute hypoxic hypercapnic respiratory failure requiring mechanical ventilation secondary to viral pneumonia. Continue mechanical ventilation support to assure adequate gas exchange. COVID-19 infection treated appropriately. Influenza A infection on Tamiflu. Continue  RSV infection supportive care. Septic shock likely due to the above on pressors and fluids. Hemodynamics are stable at this time. Acute kidney injury due to the above continue IV fluids. Non-ST elevation MI likely type II. Cardiology following. Echo is pending. DVT and GI prophylaxis. Guarded. Critical care time 36 min       William Clement MD, [facility], [facility]    The above note was generated using voice recognition software. Inadvertent typographical errors in transcription may have occurred.     Electronically signed by Brianne Campoverde MD on 12/13/22 at 12:42 PM

## 2022-12-13 NOTE — PROGRESS NOTES
Hospitalist Progress Note  [facility]     Patient: Gisela Romero  : 1950  MRN: [id number]  Code Status: Full Code    Hospital Day: 0   Date of Service: 2022    Subjective:   Patient seen in [place] [address] critical care unit. Intubated and sedated. Past Medical History:   Diagnosis Date    Abdominal hernia     COPD (chronic obstructive pulmonary disease) (HCC)     Gastritis     Hyperlipidemia     Hypertension     Pneumonia     Streptococcal pharyngitis     UTI (urinary tract infection)        Past Surgical History:   Procedure Laterality Date    CHOLECYSTECTOMY      GASTRIC BYPASS SURGERY      HYSTERECTOMY (CERVIX STATUS UNKNOWN)      JOINT REPLACEMENT Bilateral     knee       No family history on file. Social History     Socioeconomic History    Marital status:       Spouse name: Not on file    Number of children: Not on file    Years of education: Not on file    Highest education level: Not on file   Occupational History    Not on file   Tobacco Use    Smoking status: Every Day     Packs/day: 1.00     Years: 40.00     Pack years: 40.00     Types: Cigarettes    Smokeless tobacco: Never   Substance and Sexual Activity    Alcohol use: Not on file    Drug use: Not on file    Sexual activity: Not on file   Other Topics Concern    Not on file   Social History Narrative    Not on file     Social Determinants of Health     Financial Resource Strain: Not on file   Food Insecurity: Not on file   Transportation Needs: Not on file   Physical Activity: Not on file   Stress: Not on file   Social Connections: Not on file   Intimate Partner Violence: Not on file   Housing Stability: Not on file       Current Facility-Administered Medications   Medication Dose Route Frequency Provider Last Rate Last Admin    norepinephrine (LEVOPHED) 16 mg in sodium chloride 0.9 % 250 mL infusion  1-100 mcg/min IntraVENous Continuous Makenna Dowling MD 4.7 mL/hr at 22 0616 5 mcg/min at 22 3484    fentaNYL (SUBLIMAZE) 2500 mcg in sodium chloride 0.9% 250 mL premix   mcg/hr IntraVENous Continuous Jan Briones MD 2.5 mL/hr at 12/13/22 0616 25 mcg/hr at 12/13/22 0616    0.9 % sodium chloride infusion   IntraVENous Continuous Jan Briones  mL/hr at 12/13/22 0616 Rate Verify at 12/13/22 0616    sodium chloride flush 0.9 % injection 5-40 mL  5-40 mL IntraVENous 2 times per day Jan Briones MD   10 mL at 12/13/22 0849    sodium chloride flush 0.9 % injection 5-40 mL  5-40 mL IntraVENous PRN Jan Briones MD        0.9 % sodium chloride infusion   IntraVENous PRN Jan Briones MD        enoxaparin Sodium (LOVENOX) injection 30 mg  30 mg SubCUTAneous BID Jan Briones MD   30 mg at 12/13/22 0850    ondansetron (ZOFRAN-ODT) disintegrating tablet 4 mg  4 mg Oral Q8H PRN Jan Briones MD        Or    ondansetron [facility]) injection 4 mg  4 mg IntraVENous Q6H PRN Jan Briones MD        polyethylene glycol (GLYCOLAX) packet 17 g  17 g Oral Daily PRN Jan Briones MD        acetaminophen (TYLENOL) suppository 650 mg  650 mg Rectal Q6H PRN Jan Briones MD        propofol injection  5-50 mcg/kg/min IntraVENous Continuous Jan Briones MD 6.3 mL/hr at 12/13/22 0616 10 mcg/kg/min at 12/13/22 0616    vitamin D (ERGOCALCIFEROL) capsule 50,000 Units  50,000 Units Oral Weekly Jan Briones MD   50,000 Units at 12/13/22 0851    vancomycin (VANCOCIN) 2,500 mg in dextrose 5 % 500 mL IVPB  2,500 mg IntraVENous Once Jan Briones .7 mL/hr at 12/13/22 0703 2,500 mg at 12/13/22 0703    oseltamivir 6mg/ml (TAMIFLU) suspension 75 mg  75 mg Oral BID Jan Briones MD   75 mg at 12/13/22 0558    pantoprazole (PROTONIX) 40 mg in sodium chloride (PF) 0.9 % 10 mL injection  40 mg IntraVENous Daily Jan Briones MD   40 mg at 12/13/22 0854    vancomycin (VANCOCIN) 750 mg in dextrose 5 % 250 mL IVPB  750 mg IntraVENous Q12H Toby Graf MD        vancomycin [facility]) intermittent dosing (placeholder)   Other RX Placeholder Toby Graf MD        albuterol sulfate HFA (PROVENTIL;VENTOLIN;PROAIR) 108 (90 Base) MCG/ACT inhaler 2 puff  2 puff Inhalation Q4H WA Toby Graf MD   2 puff at 12/13/22 0855    ipratropium (ATROVENT HFA) 17 MCG/ACT inhaler 2 puff  2 puff Inhalation Q4H While awake Toby Graf MD   2 puff at 12/13/22 0856    dexamethasone (PF) (DECADRON) injection 6 mg  6 mg IntraVENous Q12H Champ Hilton MD   6 mg at 12/13/22 0854    acetaminophen (TYLENOL) tablet 650 mg  650 mg Oral Q6H PRN Champ Hilton MD        guaiFENesin tablet 400 mg  400 mg Oral Q8H Champ Hilton MD   400 mg at 12/13/22 0133    melatonin disintegrating tablet 10 mg  10 mg Oral Nightly Champ Hilton MD        ascorbic acid (VITAMIN C) tablet 500 mg  500 mg Oral BID Champ Hilton MD   500 mg at 12/13/22 2122    Vitamin D (CHOLECALCIFEROL) tablet 2,000 Units  2,000 Units Oral Daily Champ Hilton MD   2,000 Units at 12/13/22 0850    zinc sulfate (ZINCATE) capsule 50 mg  50 mg Oral Daily Champ Hilton MD   50 mg at 12/13/22 0853    LORazepam (ATIVAN) injection 1 mg  1 mg IntraVENous Q1H PRN William Clement MD             norepinephrine 5 mcg/min (12/13/22 0616)    fentaNYL 25 mcg/hr (12/13/22 0616)    sodium chloride 100 mL/hr at 12/13/22 0616    sodium chloride      propofol 10 mcg/kg/min (12/13/22 0616)        Objective:   /70   Pulse 58   Temp 98.9 °F (37.2 °C) (Temporal)   Resp 20   Ht 5' 7\" (1.702 m)   Wt 230 lb 2.6 oz (104.4 kg)   SpO2 96%   BMI 36.05 kg/m²     In an effort to reduce COVID-19 exposure, patient seen from doorway and case discussed in detail with unit staff    Recent Labs     12/13/22  0215   WBC 7.3   RBC 4.25   HGB 12.1   HCT 37.6   MCV 88.5   MCH 28.5   MCHC 32.2*        Recent Labs     12/13/22 0215 12/13/22 0224     --    K 4.4 4.2   ANIONGAP 13  --      --    CO2 25  --    BUN 24*  --    CREATININE 1.1*  --    GLUCOSE 136*  --    CALCIUM 8.6*  --      Recent Labs     12/13/22 0215   MG 2.0   PHOS 4.1     Recent Labs     12/13/22 0215   AST 1,049*   *   BILITOT 1.5*   ALKPHOS 167*     No results for input(s): PH, PO2, PCO2, HCO3, BE, O2SAT in the last 72 hours. Recent Labs     12/13/22 0215 12/13/22  0749   TROPONINI 0.52* 0.38*     No results for input(s): INR in the last 72 hours. Recent Labs     12/13/22  0230   LACTA 2.2*         Intake/Output Summary (Last 24 hours) at 12/13/2022 5446  Last data filed at 12/13/2022 3508  Gross per 24 hour   Intake 721.85 ml   Output --   Net 721.85 ml       XR CHEST PORTABLE    Result Date: 12/13/2022  Patient: Amanda Light  Time Out: 02:48Exam(s): FILM CXR 1 VIEW EXAM:  XR Chest, 1 ViewCLINICAL HISTORY:   Reason for exam: ETT and OG tube placement. TECHNIQUE:  Frontal view of the chest.COMPARISON:  No relevant prior studies available. FINDINGS:  Lungs:  Peripheral consolidation in the right lung base is matched by patchy opacity in the medial left lung base. Pleural space:  Unremarkable. No pneumothorax. Heart:  Unremarkable. No cardiomegaly. Mediastinum:  Unremarkable. Bones/joints:  Unremarkable. Tubes, lines and devices:  Endotracheal tube terminates 6.3 cm above the elle. Enteric tube passes below the diaphragms, tip projecting over the gastric fundus. Peripheral consolidation in the right lung base is matched by patchy opacity in the medial left lung base. Electronically signed by Lenore Pretty MD on 12-13-22 at 7487     Assessment and Plan:   COVID-19 bilateral pneumonia  Influenza A infection  Rhinovirus infection  Brief PEA arrest  Septic shock  Ventilator dependent acute hypoxemic and hypercapnic respiratory failure  History of COPD  Tobacco dependence  Morbid obesity  Transaminitis    Dexamethasone  Baricitinib  Oseltamavir  Empiric broad-spectrum antibiotics to cover potential secondary bacterial infection  Adjuvant therapy  Lung protective ventilation  Titrate minute ventilation for pH 7.35  Plateau pressure 18  Requires 3-5 L supplemental O2 at baseline  Conservative fluid management  Prone ventilation as warranted  PF ratio currently >150  Norepinephrine gtt  Lovenox for DVT prophylaxis  Discussed treatment plan with RN    Total critical care time: 52 minutes    Sharath Chin MD   12/13/2022 9:23 AM

## 2022-12-13 NOTE — PROGRESS NOTES
[facility] Pharmacokinetic Monitoring Service - Vancomycin     Aydee Pino is a 81 y.o. female starting on vancomycin therapy for Pneumonia . Pharmacy consulted by Dr Meme Burris for monitoring and adjustment. Target Concentration: Goal AUC/TAVARES 400-600 mg*hr/L    Additional Antimicrobials:     Pertinent Laboratory Values: Wt Readings from Last 1 Encounters:   12/13/22 230 lb 2.6 oz (104.4 kg)     Temp Readings from Last 1 Encounters:   12/13/22 98.9 °F (37.2 °C) (Temporal)     Estimated Creatinine Clearance: 59 mL/min (A) (based on SCr of 1.1 mg/dL (H)). Recent Labs     12/13/22  0215   CREATININE 1.1*   WBC 7.3     Procalcitonin:     Pertinent Cultures:  Culture Date Source Results   12/13/22 12/13/22 Urine  blood Collected  collected   MRSA Nasal Swab: was ordered by provider, awaiting results.     Plan:  Dosing recommendations based on Bayesian software  Start vancomycin 2500 mg x 1 then 750 mg iv every 12 hours  Anticipated AUC of 467 and trough concentration of 16.2 at steady state  Renal labs as indicated   Vancomycin concentration ordered for 12/14/22 @ 5165 Virgil Smallwood will continue to monitor patient and adjust therapy as indicated    Thank you for the consult,  Lety Martinez, [facility]  12/13/2022 5:15 AM

## 2022-12-13 NOTE — H&P
History and Physical    Patient Name:  Martha Lu    :  1950    Chief Complaint:   Resp failure, intubated     History of Present Illness:   Martha Lu presents to [facility] as transfer intubated 2/2 respiratory failure. She in in septic shock, on IVF and pressor 2/2 PNA. CXR shows peripheral consolidation in the right lung base and patchy opacity in the medial left lung base. Respiratory panel positive for SARS, Human Metapneumovirus, and Influzenza A. Labs show lactic acidosis, elevated BNP, elevated troponin, elevated LFT. EKG, echo, liver US pending. Currently MAP above 65. Past Medical History:   has a past medical history of Abdominal hernia, COPD (chronic obstructive pulmonary disease) ([address] Gastritis, Hyperlipidemia, Hypertension, Pneumonia, Streptococcal pharyngitis, and UTI (urinary tract infection). Surgical History:   has a past surgical history that includes Cholecystectomy; Hysterectomy; joint replacement (Bilateral); and Gastric bypass surgery. Social History:   reports that she has been smoking cigarettes. She has a 40.00 pack-year smoking history. She has never used smokeless tobacco.     Family History:  Unknown, pt intubated and sedated     Medications:  Prior to Admission medications    Not on File       Allergies:  Ampicillin, Aspirin, Ciprofloxacin, Contrast [iodides], Keflex [cephalexin], Penicillins, Sulfa antibiotics, Tetanus toxoids, and Tetracyclines & related     Review of Systems:   Not obtainable, pt intubated and sedated    Physical Examination:    Vital Signs: BP 91/60   Pulse 67   Temp 99.1 °F (37.3 °C) (Temporal)   Resp 20   Wt 230 lb 2.6 oz (104.4 kg)   SpO2 95%   General appearance: Well preserved, mesomorphic body habitus, intubated, sedated   Skin: Skin color, texture, turgor normal. No rashes or lesions. No induration or tightening palpated. Head: Normocephalic.  No masses, lesions, tenderness or abnormalities  Eyes: conjunctivae/corneas clear. EOM's intact. Sclera non icteric. Ears: External ears normal.  Hearing normal to finger rub. Nose/Sinuses: Nares normal. Septum midline. Mucosa normal. No drainage or sinus tenderness. Oropharynx: Lips, mucosa, and tongue normal. Oropharynx not visualized   Neck: Neck supple, and symmetric. No adenopathy. Trachea is midline. Carotids brisk in upstroke without bruits, No abnormal JVP noted at 45°. Lungs: Lungs clear to auscultation bilaterally. No retractions or use of accessory muscles. No vocal fremitus. No ronchi, crackle or rale. Heart:  S1 > S2. Regular rate and rhythm. No gallop, murmur, rub, palpable thrill or heave noted. Abdomen: Abdomen soft, non-tender. BS normal. No masses, organomegaly. No hernia noted. Extremities: Extremities normal. No deformities, edema, or skin discoloration. No cyanosis or clubbing noted to the nails. Peripheral pulses 4/4. Musculoskeletal: Spine ROM normal. Muscular strength intact. Neuro: Motor: Strength 5/5 in all extremities. No focal weakness. Pertinent Labs:  CBC:   Recent Labs     12/13/22 0215   WBC 7.3   RBC 4.25   HGB 12.1   HCT 37.6   MCV 88.5   MCH 28.5   MCHC 32.2*   RDW 13.1      MPV 9.7     BMP:   Recent Labs     12/13/22 0215 12/13/22 0224     --    K 4.4 4.2     --    CO2 25  --    BUN 24*  --    CREATININE 1.1*  --    GLUCOSE 136*  --    CALCIUM 8.6*  --      ABGs: No results for input(s): PO2, PCO2, PH, HCO3, BE, O2SAT in the last 72 hours. INR: No results for input(s): INR, PROTIME in the last 72 hours.   BNP:  No results found for: BNP  TSH:   Lab Results   Component Value Date    TSH 0.312 12/13/2022     Cardiac Injury Profile:   Lab Results   Component Value Date    TROPONINI 0.52 ([facility]) 12/13/2022     Lipid Profile: No components found for: CHLPL  No results found for: TRIG  No results found for: HDL  No results found for: LDLCALC  No results found for: LABVLDL  Hemoglobin A1C:   Lab Results   Component Value Date    LABA1C 5.3 12/13/2022     No results found for: EAG      Assessment/Plan:   Respiratory failure, requiring intubation 2/2 PNA  Septic shock 2/2 PNA- IVF, IV ab- follow cultures   Covid infection  Influenza - tamiflu  Elevated BNP- echo   Elevated troponin- EKG, echo, cardiology eval  Elevated LFT- liver US  Vit D def- replace   GERD- IV PPI               I have reviewed my findings and recommendations in detail with Riddhi Morrissey.     Ashley Proctor MD

## 2022-12-13 NOTE — PROGRESS NOTES
Pharmacy Adjustment per [facility] protocol    Nishi Miranda is a 81 y.o. female. Pharmacy has adjusted medications per [facility] protocol. Recent Labs     12/13/22 0215   BUN 24*       Recent Labs     12/13/22 0215   CREATININE 1.1*       Estimated Creatinine Clearance: 59 mL/min (A) (based on SCr of 1.1 mg/dL (H)).     Height:   Ht Readings from Last 1 Encounters:   12/13/22 5' 7\" (1.702 m)     Weight:  Wt Readings from Last 1 Encounters:   12/13/22 230 lb 2.6 oz (104.4 kg)         Plan: Adjust the following medications based on [facility] protocol:           Meropenem to 1000 mg IV once over 30 minutes followed by 1000 mg IV every 8 hours extended infusion over 180 minutes     Electronically signed by Michele Dean [facility] on 12/13/2022 at 9:42 AM

## 2022-12-13 NOTE — PLAN OF CARE
Nutrition Problem #1: Inadequate oral intake  Intervention: Food and/or Nutrient Delivery: Continue NPO, Start Tube Feeding

## 2022-12-13 NOTE — PROGRESS NOTES
Pharmacy Consult      Cathie Acosta is a 81 y.o. female for whom pharmacy has been consulted to dose baricitinib. Patient Active Problem List   Diagnosis    Respiratory failure ([facility] [address]    Palliative care patient       Allergies:  Ampicillin, Aspirin, Ciprofloxacin, Contrast [iodides], Keflex [cephalexin], Penicillins, Sulfa antibiotics, Tetanus toxoids, and Tetracyclines & related     Ht/Wt:   Ht Readings from Last 1 Encounters:   12/13/22 5' 7\" (1.702 m)        Wt Readings from Last 1 Encounters:   12/13/22 230 lb 2.6 oz (104.4 kg)         Does the patient meet all of the criteria for receiving baricitinib (see below)? Yes. Has a daily CMP (or LFTs) and CBC with auto differential been ordered? Yes. If either are not ordered, the pharmacist should enter CMP and CBC with auto differential daily (per pharmacy practice) under the physician authorizing baricitinib. Recent Labs     12/13/22  0215   LABGLOM 54*   *   AST 1,049*           Assessment/Plan:    Start patient on baricitinib 2 mg daily for 14 days of total treatment or until hospital discharge, whichever is first. Pharmacy will continue to follow GFR, ALC, ANC and aminotransferases. Discussed with Dr. Ze Mcclendon, patient would benefit from Baricitinib even with her elevated AST/ALT. Thank you for the consult.      Electronically signed by Leopoldo Crest, RPh, BCPS, 12/13/2022,9:43 AM

## 2022-12-14 NOTE — PROGRESS NOTES
Cardiology Progress Note Melissa Jackson MD      Patient:  Gisela Romero  [id number]    Patient Active Problem List    Diagnosis Date Noted    Non-STEMI (non-ST elevated myocardial infarction) ([facility] [address] 12/13/2022     Priority: High    Acute systolic heart failure ([facility] [address] 12/13/2022     Priority: High    Respiratory failure ([facility] [address] 12/13/2022     Priority: Medium    Palliative care patient 12/13/2022     Priority: Medium       Admit Date:  12/13/2022    Admission Problem List: Present on Admission:   Respiratory failure ([facility] [address]   Non-STEMI (non-ST elevated myocardial infarction) ([facility] [address]   Acute systolic heart failure [facility])      Cardiac Specific Data:  Specialty Problems          Cardiology Problems    Acute systolic heart failure ([facility] [address]        Non-STEMI (non-ST elevated myocardial infarction) ([facility] [address]         1. Acute hypercapnic respiratory failure, mechanically ventilated with longstanding ongoing tobacco use and COPD. 2.  Acute COVID infection with positive influenza A and rhinovirus infection, possible right lower lobe consolidation/effusion. 3.  Non-STEMI with severe apical hypokinesis, ejection fraction 45%, Takotsubo versus LAD territory ischemia. Subjective:  Ms. Reny Noble remains intubated and sedated. Increase oxygen requirement with FiO2 of 60%. Still on low-dose Levophed. Objective:   BP (!) 95/49   Pulse 58   Temp 98.4 °F (36.9 °C) (Tympanic)   Resp 18   Ht 5' 7\" (1.702 m)   Wt 230 lb 2.6 oz (104.4 kg)   SpO2 95%   BMI 36.05 kg/m²       Intake/Output Summary (Last 24 hours) at 12/14/2022 1659  Last data filed at 12/14/2022 1400  Gross per 24 hour   Intake 2627.37 ml   Output 2171 ml   Net 456.37 ml       Prior to Admission medications    Medication Sig Start Date End Date Taking?  Authorizing Provider   lisinopril (PRINIVIL;ZESTRIL) 40 MG tablet Take 40 mg by mouth daily   Yes Historical Provider, MD   montelukast (SINGULAIR) 10 MG tablet Take 10 mg by mouth nightly   Yes Historical Provider, MD   busPIRone (BUSPAR) 30 MG tablet Take 30 mg by mouth in the morning and at bedtime   Yes Historical Provider, MD   hydrOXYzine HCl (ATARAX) 50 MG tablet Take 50 mg by mouth nightly   Yes Historical Provider, MD   mirtazapine (REMERON SOL-TAB) 30 MG disintegrating tablet Take 45 mg by mouth nightly   Yes Historical Provider, MD   gabapentin (NEURONTIN) 300 MG capsule Take 300 mg by mouth 3 times daily. Yes Historical Provider, MD   sennosides (SENOKOT) 8.8 MG/5ML syrup Take 5 mLs by mouth nightly   Yes Historical Provider, MD   HYDROcodone-acetaminophen (NORCO) 7.5-325 MG per tablet Take 1 tablet by mouth every 6 hours as needed for Pain.    Yes Historical Provider, MD   ondansetron (ZOFRAN-ODT) 4 MG disintegrating tablet Take 4 mg by mouth every 8 hours as needed for Nausea or Vomiting   Yes Historical Provider, MD   folic acid (FOLVITE) 1 MG tablet Take 1 mg by mouth daily   Yes Historical Provider, MD   buPROPion (WELLBUTRIN XL) 150 MG extended release tablet Take 150 mg by mouth every morning   Yes Historical Provider, MD   meloxicam (MOBIC) 7.5 MG tablet Take 7.5 mg by mouth daily   Yes Historical Provider, MD   rosuvastatin (CRESTOR) 20 MG tablet Take 20 mg by mouth nightly   Yes Historical Provider, MD   DULoxetine (CYMBALTA) 60 MG extended release capsule Take 60 mg by mouth nightly 2 capsules   Yes Historical Provider, MD   metoprolol succinate (TOPROL XL) 100 MG extended release tablet Take 100 mg by mouth nightly   Yes Historical Provider, MD   levothyroxine (SYNTHROID) 25 MCG tablet Take 25 mcg by mouth nightly   Yes Historical Provider, MD        meropenem  1,000 mg IntraVENous Q12H    oseltamivir 6mg/ml  30 mg Oral BID    sodium chloride flush  5-40 mL IntraVENous 2 times per day    pantoprazole (PROTONIX) 40 mg injection  40 mg IntraVENous Daily    albuterol sulfate HFA  2 puff Inhalation Q4H WA    ipratropium  2 puff Inhalation Q4H While awake    dexamethasone  6 mg IntraVENous Q12H    guaiFENesin  400 mg Oral Q8H    melatonin  10 mg Oral Nightly    vitamin C  500 mg Oral BID    Vitamin D  2,000 Units Oral Daily    zinc sulfate  50 mg Oral Daily    baricitinib  2 mg Oral Daily    [Held by provider] furosemide  20 mg IntraVENous BID    atorvastatin  40 mg Oral Nightly    enoxaparin  1 mg/kg (Adjusted) SubCUTAneous BID    vitamin D  50,000 Units Oral Weekly       TELEMETRY: Sinus     Physical Exam:      Physical Exam  Constitutional:       General: She is not in acute distress. Appearance: She is not diaphoretic. Comments: Intubated and sedated. HENT:      Mouth/Throat:      Pharynx: No oropharyngeal exudate. Eyes:      General: No scleral icterus. Right eye: No discharge. Left eye: No discharge. Neck:      Thyroid: No thyromegaly. Vascular: No JVD. Cardiovascular:      Rate and Rhythm: Normal rate and regular rhythm. No extrasystoles are present. Heart sounds: Normal heart sounds, S1 normal and S2 normal. No murmur heard. No systolic murmur is present. No diastolic murmur is present. No friction rub. No gallop. No S3 or S4 sounds. Comments: No obvious jugulovenous distention  Extremities cool  No edema  No murmurs noted  Pulmonary:      Effort: Pulmonary effort is normal. No respiratory distress. Breath sounds: Normal breath sounds. No wheezing or rales. Chest:      Chest wall: No tenderness. Abdominal:      General: Bowel sounds are normal. There is no distension. Palpations: Abdomen is soft. There is no mass. Tenderness: There is no abdominal tenderness. There is no guarding or rebound. Hernia: No hernia is present. Comments: Soft, nontender  No palpable organomegaly   Skin:     Coloration: Skin is not pale. Findings: No rash.    Neurological:      Comments: Sedated               Lab Data:  CBC:   Recent Labs     12/13/22  0215 12/14/22  0520   WBC 7.3 12.0*   HGB 12.1 11.3*   HCT 37.6 34.4*   MCV 88.5 88.0    142     BMP: Recent Labs     12/13/22  0215 12/13/22  0224 12/14/22  0428 12/14/22  0520     --   --  139   K 4.4 4.2 3.6 3.7     --   --  101   CO2 25  --   --  28   PHOS 4.1  --   --   --    BUN 24*  --   --  44*   CREATININE 1.1*  --   --  1.5*     LIVER PROFILE:   Recent Labs     12/13/22  0215 12/14/22  0520   AST 1,049* 810*   * 515*   LIPASE 20  --    BILITOT 1.5* 0.5   ALKPHOS 167* 146*     PT/INR: No results for input(s): PROTIME, INR in the last 72 hours. APTT: No results for input(s): APTT in the last 72 hours. BNP:  No results for input(s): BNP in the last 72 hours. CK, CKMB, Troponin: @LABRCNT (CKTOTAL:3, CKMB:3, TROPONINI:3)@    IMAGING:  XR CHEST PORTABLE    Result Date: 12/13/2022  Patient: Ganesh Mccord  Time Out: 02:48Exam(s): FILM CXR 1 VIEW EXAM:  XR Chest, 1 ViewCLINICAL HISTORY:   Reason for exam: ETT and OG tube placement. TECHNIQUE:  Frontal view of the chest.COMPARISON:  No relevant prior studies available. FINDINGS:  Lungs:  Peripheral consolidation in the right lung base is matched by patchy opacity in the medial left lung base. Pleural space:  Unremarkable. No pneumothorax. Heart:  Unremarkable. No cardiomegaly. Mediastinum:  Unremarkable. Bones/joints:  Unremarkable. Tubes, lines and devices:  Endotracheal tube terminates 6.3 cm above the elle. Enteric tube passes below the diaphragms, tip projecting over the gastric fundus. Peripheral consolidation in the right lung base is matched by patchy opacity in the medial left lung base. Electronically signed by Kelvin Oglesby MD on 12-13-22 at 2905        Assessment and Plan:     This is a 81y.o. year old female with past medical history of active ongoing longstanding tobacco use with COPD, prior history of acute cardiomyopathy 2016 without significant follow-up, presenting with progressively worsening shortness of breath with hypercapnic respiratory failure requiring mechanical ventilation with positive COVID

## 2022-12-14 NOTE — PLAN OF CARE
Problem: Discharge Planning  Goal: Discharge to home or other facility with appropriate resources  Outcome: Not Progressing     Problem: Discharge Planning  Goal: Discharge to home or other facility with appropriate resources  Outcome: Not Progressing

## 2022-12-14 NOTE — PROGRESS NOTES
Comprehensive Nutrition Assessment    Type and Reason for Visit:  Reassess    Nutrition Recommendations/Plan:   Continue POC. Malnutrition Assessment:  Malnutrition Status: At risk for malnutrition (Comment) (12/13/22 1017)    Context:  Acute Illness       Nutrition Assessment:    Pt improving from a nutritional standpoint with initiation of EN. Jevity 1.5 @ 15 mL/hr running with no s/sx intolerance documented. Propofol @ 9.4mL/hr currently providing 248 kcal/day. Glucose remains mildly elevated. Continue current POC. Current Nutrition Intake & Therapies:    Average Meal Intake: NPO     Diet NPO Exceptions are: Sips of Water with Meds  ADULT TUBE FEEDING; Orogastric; Standard with Fiber; Continuous; 15; No; 30; Q 1 hour; Protein; Flush tube with Proteinex TID  Current Tube Feeding (TF) Orders:  Feeding Route: Orogastric  Formula: Standard with Fiber  Schedule: Continuous  Feeding Regimen: Jevity 1.5 @ 15 mL/hr with Proteinex flush TID  Additives/Modulars: Protein (TID)  Water Flushes: 30 mL/hf=723 mL/day  Current TF & Flush Orders Provides: Jevity 1.5 @ 15 mL/hr with Proteinex TID= 852 kcal, 101 g PRO, 994 mL free water from formula plus flushes. Additional 248 kcal from Propofol.   Additional Calorie Sources:  Propofol @ 9.4 mL/hr= 248 kcal/day    Anthropometric Measures:  Height: 5' 7\" (170.2 cm)  Ideal Body Weight (IBW): 135 lbs (61 kg)    Current Body Weight: 230 lb (104.3 kg)  Current BMI (kg/m2): 36   BMI Categories: Obese Class 2 (BMI 35.0 -39.9)    Estimated Daily Nutrient Needs:  Energy Requirements Based On: Kcal/kg  Weight Used for Energy Requirements: Current  Energy (kcal/day): 6340-6208 kcals/day (11-14)  Weight Used for Protein Requirements: Ideal  Protein (g/day): 122 g/protein/day (2.0)  Method Used for Fluid Requirements: 1 ml/kcal  Fluid (ml/day): 8778-6074 mL/day    Nutrition Diagnosis:   Inadequate oral intake related to impaired respiratory function as evidenced by NPO or clear liquid status due to medical condition, intubation    Nutrition Interventions:   Food and/or Nutrient Delivery: Continue Current Tube Feeding  Nutrition Education/Counseling: No recommendation at this time  Coordination of Nutrition Care: Continue to monitor while inpatient       Goals:  Previous Goal Met: Progressing toward Goal(s)  Goals: Tolerate nutrition support at goal rate       Nutrition Monitoring and Evaluation:      Food/Nutrient Intake Outcomes: Enteral Nutrition Intake/Tolerance  Physical Signs/Symptoms Outcomes: Biochemical Data, Nutrition Focused Physical Findings, Weight    Discharge Planning:     Too soon to determine     Gaines Juan C MS, RDN, LD, CDCES  Contact: Joelle

## 2022-12-14 NOTE — PROGRESS NOTES
Pharmacy Renal Adjustment    Rafael Sam is a 81 y.o. female. Pharmacy has renally adjusted medications per protocol. Recent Labs     12/13/22 0215 12/14/22  0520   BUN 24* 44*       Recent Labs     12/13/22 0215 12/14/22  0520   CREATININE 1.1* 1.5*       Estimated Creatinine Clearance: 43 mL/min (A) (based on SCr of 1.5 mg/dL (H)). Height:   Ht Readings from Last 1 Encounters:   12/13/22 5' 7\" (1.702 m)     Weight:  Wt Readings from Last 1 Encounters:   12/13/22 230 lb 2.6 oz (104.4 kg)       CKD stage: DOLORES         Baseline SCr: Unspecified    Plan: Adjust the following medications based on renal function:           Meropenem 1 gm IV every 8 hours adjusted to meropenem 1 gm IV every 12 hours.      Electronically signed by Margie Adler, [address] [place] [address] on 12/14/2022 at 10:42 AM

## 2022-12-14 NOTE — CONSULTS
[facility]  Cardiology Consult      Requesting MD:  Aubrey Yang MD   Admit Status:         History obtained from:   [] Patient  [] Other (specify):     PROBLEM LIST:    Patient Active Problem List    Diagnosis Date Noted    Respiratory failure ([address] 12/13/2022     Priority: Medium    Palliative care patient 12/13/2022     Priority: Medium     1. Acute hypercapnic respiratory failure, mechanically ventilated with longstanding ongoing tobacco use and COPD. 2.  Acute COVID infection with positive influenza A and rhinovirus infection, possible right lower lobe consolidation/effusion. 3.  Non-STEMI with severe apical hypokinesis, ejection fraction 45%. PRESENTATION: Barbara Thacker is a 81y.o. year old female who presents as a transfer from outside hospital with respiratory failure, mechanically ventilated. Noted to be COVID-positive with positive titers for human rhinovirus as well as influenza A with possible right lower lobe consolidation. Noted to have elevated troponin levels of 0.52-0. 38.  proBNP elevated at 9300. EKG with sinus rhythm and low voltage and poor R wave progression. No ST elevation. Echo with severe apical hypokinesis with mildly reduced overall LV systolic function. Daughter at bedside and provides history. Patient symptoms began about 5 to 7 days ago with increasing shortness of breath. She was placed on steroids briefly. Without significant improvement. Symptoms progressed with onset of mental confusion and respiratory failure requiring intubation. No chest pain reported. Longstanding active ongoing tobacco use with known COPD. Daughter reports a similar episode of symptoms with reported LV function of 20% in 2016 which subsequently was followed briefly by cardiology with no follow-up after that. REVIEW OF SYSTEMS:  Review of Systems   Unable to perform ROS: Intubated   Respiratory:  Positive for shortness of breath.       Past Medical History:      Diagnosis Date    Abdominal hernia     COPD (chronic obstructive pulmonary disease) (HCC)     Gastritis     Hyperlipidemia     Hypertension     Pneumonia     Streptococcal pharyngitis     UTI (urinary tract infection)        Past Surgical History:      Procedure Laterality Date    CHOLECYSTECTOMY      GASTRIC BYPASS SURGERY      HYSTERECTOMY (CERVIX STATUS UNKNOWN)      JOINT REPLACEMENT Bilateral     knee       Allergies:  Ampicillin, Aspirin, Ciprofloxacin, Contrast [iodides], Keflex [cephalexin], Penicillins, Sulfa antibiotics, Tetanus toxoids, and Tetracyclines & related    Past Social History:  Social History     Socioeconomic History    Marital status:      Spouse name: Not on file    Number of children: Not on file    Years of education: Not on file    Highest education level: Not on file   Occupational History    Not on file   Tobacco Use    Smoking status: Every Day     Packs/day: 1.00     Years: 40.00     Pack years: 40.00     Types: Cigarettes    Smokeless tobacco: Never   Substance and Sexual Activity    Alcohol use: Not on file    Drug use: Not on file    Sexual activity: Not on file   Other Topics Concern    Not on file   Social History Narrative    Not on file     Social Determinants of Health     Financial Resource Strain: Not on file   Food Insecurity: Not on file   Transportation Needs: Not on file   Physical Activity: Not on file   Stress: Not on file   Social Connections: Not on file   Intimate Partner Violence: Not on file   Housing Stability: Not on file       Family History:   No family history on file. Home Meds:  Prior to Admission medications    Medication Sig Start Date End Date Taking?  Authorizing Provider   lisinopril (PRINIVIL;ZESTRIL) 40 MG tablet Take 40 mg by mouth daily   Yes Historical Provider, MD   montelukast (SINGULAIR) 10 MG tablet Take 10 mg by mouth nightly   Yes Historical Provider, MD   busPIRone (BUSPAR) 30 MG tablet Take 30 mg by mouth in the morning and at bedtime   Yes Historical Provider, MD   hydrOXYzine HCl (ATARAX) 50 MG tablet Take 50 mg by mouth nightly   Yes Historical Provider, MD   mirtazapine (REMERON SOL-TAB) 30 MG disintegrating tablet Take 45 mg by mouth nightly   Yes Historical Provider, MD   gabapentin (NEURONTIN) 300 MG capsule Take 300 mg by mouth 3 times daily. Yes Historical Provider, MD   sennosides (SENOKOT) 8.8 MG/5ML syrup Take 5 mLs by mouth nightly   Yes Historical Provider, MD   HYDROcodone-acetaminophen (NORCO) 7.5-325 MG per tablet Take 1 tablet by mouth every 6 hours as needed for Pain.    Yes Historical Provider, MD   ondansetron (ZOFRAN-ODT) 4 MG disintegrating tablet Take 4 mg by mouth every 8 hours as needed for Nausea or Vomiting   Yes Historical Provider, MD   folic acid (FOLVITE) 1 MG tablet Take 1 mg by mouth daily   Yes Historical Provider, MD   buPROPion (WELLBUTRIN XL) 150 MG extended release tablet Take 150 mg by mouth every morning   Yes Historical Provider, MD   meloxicam (MOBIC) 7.5 MG tablet Take 7.5 mg by mouth daily   Yes Historical Provider, MD   rosuvastatin (CRESTOR) 20 MG tablet Take 20 mg by mouth nightly   Yes Historical Provider, MD   DULoxetine (CYMBALTA) 60 MG extended release capsule Take 60 mg by mouth nightly 2 capsules   Yes Historical Provider, MD   metoprolol succinate (TOPROL XL) 100 MG extended release tablet Take 100 mg by mouth nightly   Yes Historical Provider, MD   levothyroxine (SYNTHROID) 25 MCG tablet Take 25 mcg by mouth nightly   Yes Historical Provider, MD       Current Meds:   sodium chloride flush  5-40 mL IntraVENous 2 times per day    enoxaparin  30 mg SubCUTAneous BID    vitamin D  50,000 Units Oral Weekly    oseltamivir 6mg/ml  75 mg Oral BID    pantoprazole (PROTONIX) 40 mg injection  40 mg IntraVENous Daily    vancomycin  750 mg IntraVENous Q12H    vancomycin (VANCOCIN) intermittent dosing (placeholder)   Other RX Placeholder    albuterol sulfate HFA  2 puff Inhalation Q4H WA    ipratropium  2 puff Inhalation Q4H While awake    dexamethasone  6 mg IntraVENous Q12H    guaiFENesin  400 mg Oral Q8H    melatonin  10 mg Oral Nightly    vitamin C  500 mg Oral BID    Vitamin D  2,000 Units Oral Daily    zinc sulfate  50 mg Oral Daily    meropenem  1,000 mg IntraVENous Q8H    baricitinib  2 mg Oral Daily       Current Infused Meds:   norepinephrine Stopped (12/13/22 1758)    fentaNYL 25 mcg/hr (12/13/22 0616)    sodium chloride 100 mL/hr at 12/13/22 1250    propofol 10 mcg/kg/min (12/13/22 1252)       Physical Exam:  Vitals:    12/13/22 1600   BP: 100/64   Pulse: 60   Resp: 18   Temp:    SpO2: 96%       Intake/Output Summary (Last 24 hours) at 12/13/2022 1812  Last data filed at 12/13/2022 1600  Gross per 24 hour   Intake 1838.18 ml   Output 550 ml   Net 1288.18 ml     Estimated body mass index is 36.05 kg/m² as calculated from the following:    Height as of this encounter: 5' 7\" (1.702 m). Weight as of this encounter: 230 lb 2.6 oz (104.4 kg). Physical Exam  Constitutional:       General: She is not in acute distress. Appearance: She is obese. She is not diaphoretic. Comments: Intubated and sedated on propofol and fentanyl. HENT:      Mouth/Throat:      Pharynx: No oropharyngeal exudate. Eyes:      General: No scleral icterus. Right eye: No discharge. Left eye: No discharge. Neck:      Thyroid: No thyromegaly. Vascular: No JVD. Cardiovascular:      Rate and Rhythm: Normal rate and regular rhythm. No extrasystoles are present. Heart sounds: Normal heart sounds, S1 normal and S2 normal. No murmur heard. No systolic murmur is present. No diastolic murmur is present. No friction rub. No gallop. No S3 or S4 sounds. Comments: JVP difficult to discern lying flat  No pitting edema  Extremities are cool  No obvious systolic or diastolic murmurs noted  Pulmonary:      Effort: Pulmonary effort is normal. No respiratory distress. Breath sounds: Normal breath sounds. No wheezing or rales. Comments: Air entry present in both lung fields  Mechanically ventilated  Chest:      Chest wall: No tenderness. Abdominal:      General: Bowel sounds are normal. There is no distension. Palpations: Abdomen is soft. There is no mass. Tenderness: There is no abdominal tenderness. There is no guarding or rebound. Hernia: No hernia is present. Comments: Soft, nontender  No palpable organomegaly   Skin:     Coloration: Skin is not pale. Findings: No rash. Neurological:      Comments: Moving extremities but currently sedated         Labs:  Recent Labs     12/13/22 0215   WBC 7.3   HGB 12.1          Recent Labs     12/13/22 0215 12/13/22 0224     --    K 4.4 4.2     --    CO2 25  --    BUN 24*  --    CREATININE 1.1*  --    LABGLOM 54*  --    MG 2.0  --    CALCIUM 8.6*  --    PHOS 4.1  --        CK, CKMB, Troponin: @LABRCNT (CKTOTAL:3, CKMB:3, TROPONINI:3)@    Last 3 BNP:          IMAGING:  XR CHEST PORTABLE    Result Date: 12/13/2022  Patient: Swapna Garibay  Time Out: 02:48Exam(s): FILM CXR 1 VIEW EXAM:  XR Chest, 1 ViewCLINICAL HISTORY:   Reason for exam: ETT and OG tube placement. TECHNIQUE:  Frontal view of the chest.COMPARISON:  No relevant prior studies available. FINDINGS:  Lungs:  Peripheral consolidation in the right lung base is matched by patchy opacity in the medial left lung base. Pleural space:  Unremarkable. No pneumothorax. Heart:  Unremarkable. No cardiomegaly. Mediastinum:  Unremarkable. Bones/joints:  Unremarkable. Tubes, lines and devices:  Endotracheal tube terminates 6.3 cm above the elle. Enteric tube passes below the diaphragms, tip projecting over the gastric fundus. Peripheral consolidation in the right lung base is matched by patchy opacity in the medial left lung base. Electronically signed by Ashlee Watkins MD on 12-13-22 at 9340          Assessment and Plan:    This is a 81y.o. year old female with past medical history of active ongoing longstanding tobacco use with COPD, prior history of acute cardiomyopathy 2016 without significant follow-up, presenting with progressively worsening shortness of breath with hypercapnic respiratory failure requiring mechanical ventilation with positive COVID infection, positive human rhinovirus and influenza A, right lower lobe consolidation, non-STEMI with apical severe hypokinesis, mildly reduced overall LV function. 1.  Difficult to differentiate possible stress-induced Takotsubo's cardiomyopathy versus obstructive coronary disease involving LAD territory with current presentation. Cardiac catheterization will be needed to differentiate this. Currently with active COVID infection and mechanically ventilated. We will continue to manage medically at this time. No overt signs of volume overload peripherally currently on 50% FiO2. Elevated BNP. Will initiate on low-dose Lasix at 20 mg IV twice daily. 2.  Increase Lovenox to 80 mg SQ twice daily. Add statin therapy. Noted allergy to aspirin. Currently on low-dose Levophed and will hold off initiation of beta-blocker or nitrates. 3.  We will continue to follow.       Electronically signed by Yessica Mohr MD on 12/13/2022 at 6:12 PM

## 2022-12-14 NOTE — PROGRESS NOTES
Hospitalist Progress Note  [facility]     Patient: Riddhi Morrissey  : 1950  MRN: [id number]  Code Status: Full Code    Hospital Day: 1   Date of Service: 2022    Subjective:   Patient seen in [place] [address] critical care unit. Intubated and sedated. Past Medical History:   Diagnosis Date    Abdominal hernia     COPD (chronic obstructive pulmonary disease) (HCC)     Gastritis     Hyperlipidemia     Hypertension     Pneumonia     Streptococcal pharyngitis     UTI (urinary tract infection)        Past Surgical History:   Procedure Laterality Date    CHOLECYSTECTOMY      GASTRIC BYPASS SURGERY      HYSTERECTOMY (CERVIX STATUS UNKNOWN)      JOINT REPLACEMENT Bilateral     knee       No family history on file. Social History     Socioeconomic History    Marital status:       Spouse name: Not on file    Number of children: Not on file    Years of education: Not on file    Highest education level: Not on file   Occupational History    Not on file   Tobacco Use    Smoking status: Every Day     Packs/day: 1.00     Years: 40.00     Pack years: 40.00     Types: Cigarettes    Smokeless tobacco: Never   Substance and Sexual Activity    Alcohol use: Not on file    Drug use: Not on file    Sexual activity: Not on file   Other Topics Concern    Not on file   Social History Narrative    Not on file     Social Determinants of Health     Financial Resource Strain: Not on file   Food Insecurity: Not on file   Transportation Needs: Not on file   Physical Activity: Not on file   Stress: Not on file   Social Connections: Not on file   Intimate Partner Violence: Not on file   Housing Stability: Not on file       Current Facility-Administered Medications   Medication Dose Route Frequency Provider Last Rate Last Admin    meropenem (MERREM) 1000 mg in sodium chloride 0.9% 50 mL (duplex)  1,000 mg IntraVENous Q12H Halie Newton MD        oseltamivir 6mg/ml (TAMIFLU) suspension 30 mg  30 mg Oral BID Cammie Suarez MD        norepinephrine (LEVOPHED) 16 mg in sodium chloride 0.9 % 250 mL infusion  1-100 mcg/min IntraVENous Continuous Cammie Suarez MD 0.9 mL/hr at 12/14/22 0847 1 mcg/min at 12/14/22 0847    fentaNYL (SUBLIMAZE) 2500 mcg in sodium chloride 0.9% 250 mL premix   mcg/hr IntraVENous Continuous Cammie Suarez MD 5 mL/hr at 12/14/22 0544 50 mcg/hr at 12/14/22 0544    sodium chloride flush 0.9 % injection 5-40 mL  5-40 mL IntraVENous 2 times per day Cammie Suarez MD   10 mL at 12/14/22 0840    sodium chloride flush 0.9 % injection 5-40 mL  5-40 mL IntraVENous PRN Cammie Suarez MD        0.9 % sodium chloride infusion   IntraVENous PRN Cammie Suarez MD 15 mL/hr at 12/14/22 0544 Rate Verify at 12/14/22 0544    ondansetron (ZOFRAN-ODT) disintegrating tablet 4 mg  4 mg Oral Q8H PRN Cammie Suarez MD        Or    ondansetron [facility]) injection 4 mg  4 mg IntraVENous Q6H PRN Cammie Suarez MD        polyethylene glycol (GLYCOLAX) packet 17 g  17 g Oral Daily PRN Cammie Suarez MD        acetaminophen (TYLENOL) suppository 650 mg  650 mg Rectal Q6H PRN Cammie Suarez MD        propofol injection  5-50 mcg/kg/min IntraVENous Continuous Cammie Suarez MD 9.4 mL/hr at 12/14/22 1120 15 mcg/kg/min at 12/14/22 1120    pantoprazole (PROTONIX) 40 mg in sodium chloride (PF) 0.9 % 10 mL injection  40 mg IntraVENous Daily Cammie Suarez MD   40 mg at 12/14/22 0837    [Held by provider] vancomycin (VANCOCIN) 750 mg in dextrose 5 % 250 mL IVPB  750 mg IntraVENous Q12H Cammie Suarez MD   Stopped at 12/14/22 0834    vancomycin (VANCOCIN) intermittent dosing (placeholder)   Other RX Jass Chatterjee MD        albuterol sulfate HFA (PROVENTIL;VENTOLIN;PROAIR) 108 (90 Base) MCG/ACT inhaler 2 puff  2 puff Inhalation Q4H WA Cammie Suarez MD   2 puff at 12/14/22 0270 ipratropium (ATROVENT HFA) 17 MCG/ACT inhaler 2 puff  2 puff Inhalation Q4H While awake Elke Carrera MD   2 puff at 12/14/22 0836    dexamethasone (PF) (DECADRON) injection 6 mg  6 mg IntraVENous Q12H Abbie Angel MD   6 mg at 12/14/22 1331    acetaminophen (TYLENOL) tablet 650 mg  650 mg Oral Q6H PRN Abbie Angel MD        guaiFENesin tablet 400 mg  400 mg Oral Q8H Abbie Angel MD   400 mg at 12/14/22 1808    melatonin disintegrating tablet 10 mg  10 mg Oral Nightly Abbie Angel MD   10 mg at 12/13/22 2015    ascorbic acid (VITAMIN C) tablet 500 mg  500 mg Oral BID Abbie Angel MD   500 mg at 12/14/22 8033    Vitamin D (CHOLECALCIFEROL) tablet 2,000 Units  2,000 Units Oral Daily Abbie Angel MD   2,000 Units at 12/14/22 6857    zinc sulfate (ZINCATE) capsule 50 mg  50 mg Oral Daily Abbie Angel MD   50 mg at 12/14/22 0838    LORazepam (ATIVAN) injection 1 mg  1 mg IntraVENous Q1H PRN William Clement MD   1 mg at 12/14/22 1030    baricitinib (OLUMIANT) tablet 2 mg  2 mg Oral Daily Abbie Angel MD   2 mg at 12/14/22 7646    perflutren lipid microspheres (DEFINITY) injection 1.5 mL  1.5 mL IntraVENous ONCE PRN Abbie Angel MD   1.5 mL at 12/13/22 1644    [Held by provider] furosemide (LASIX) injection 20 mg  20 mg IntraVENous BID Emir Roberson MD   20 mg at 12/13/22 1834    atorvastatin (LIPITOR) tablet 40 mg  40 mg Oral Nightly Emir Roberson MD   40 mg at 12/13/22 2015    enoxaparin (LOVENOX) injection 80 mg  1 mg/kg (Adjusted) SubCUTAneous BID Emir Roberson MD   80 mg at 12/14/22 0836    vitamin D (ERGOCALCIFEROL) capsule 50,000 Units  50,000 Units Oral Weekly Elke Carrera MD   50,000 Units at 12/14/22 0837         norepinephrine 1 mcg/min (12/14/22 0847)    fentaNYL 50 mcg/hr (12/14/22 0544)    sodium chloride 15 mL/hr at 12/14/22 0544    propofol 15 mcg/kg/min (12/14/22 1120)        Objective:   BP (!) 108/58   Pulse 62   Temp 99.4 °F (37.4 °C) (Temporal)   Resp 18   Ht 5' 7\" (1.702 m)   Wt 230 lb 2.6 oz (104.4 kg)   SpO2 96%   BMI 36.05 kg/m²     In an effort to reduce COVID-19 exposure, patient seen from doorway and case discussed in detail with unit staff    Recent Labs     12/13/22 0215 12/14/22  0520   WBC 7.3 12.0*   RBC 4.25 3.91*   HGB 12.1 11.3*   HCT 37.6 34.4*   MCV 88.5 88.0   MCH 28.5 28.9   MCHC 32.2* 32.8*    142     Recent Labs     12/13/22 0215 12/13/22  0224 12/14/22  0428 12/14/22  0520     --   --  139   K 4.4 4.2 3.6 3.7   ANIONGAP 13  --   --  10     --   --  101   CO2 25  --   --  28   BUN 24*  --   --  44*   CREATININE 1.1*  --   --  1.5*   GLUCOSE 136*  --   --  146*   CALCIUM 8.6*  --   --  7.8*     Recent Labs     12/13/22 0215 12/14/22  0520   MG 2.0 2.0   PHOS 4.1  --      Recent Labs     12/13/22 0215 12/14/22  0520   AST 1,049* 810*   * 515*   BILITOT 1.5* 0.5   ALKPHOS 167* 146*     No results for input(s): PH, PO2, PCO2, HCO3, BE, O2SAT in the last 72 hours. Recent Labs     12/13/22 0215 12/13/22  0749   TROPONINI 0.52* 0.38*     No results for input(s): INR in the last 72 hours. Recent Labs     12/13/22  0230   LACTA 2.2*         Intake/Output Summary (Last 24 hours) at 12/14/2022 1130  Last data filed at 12/14/2022 0544  Gross per 24 hour   Intake 2435.12 ml   Output 1561 ml   Net 874.12 ml       XR CHEST PORTABLE    Result Date: 12/13/2022  Patient: Coretta Saleem  Time Out: 02:48Exam(s): FILM CXR 1 VIEW EXAM:  XR Chest, 1 ViewCLINICAL HISTORY:   Reason for exam: ETT and OG tube placement. TECHNIQUE:  Frontal view of the chest.COMPARISON:  No relevant prior studies available. FINDINGS:  Lungs:  Peripheral consolidation in the right lung base is matched by patchy opacity in the medial left lung base. Pleural space:  Unremarkable. No pneumothorax. Heart:  Unremarkable. No cardiomegaly. Mediastinum:  Unremarkable. Bones/joints:  Unremarkable.   Tubes, lines and devices:  Endotracheal tube terminates 6.3 cm above the elle. Enteric tube passes below the diaphragms, tip projecting over the gastric fundus. Peripheral consolidation in the right lung base is matched by patchy opacity in the medial left lung base. Electronically signed by Les Thornton MD on 12-13-22 at 8939     Assessment and Plan:   COVID-19 bilateral pneumonia  Influenza A infection  Rhinovirus infection  Klebsiella pneumoniae respiratory infection  Brief PEA arrest  Septic shock  Ventilator dependent acute hypoxemic and hypercapnic respiratory failure  History of COPD  Tobacco dependence  Morbid obesity  Transaminitis  DOLORES     Dexamethasone  Baricitinib  Oseltamavir  Broad-spectrum antibiotics  Adjuvant therapy  Lung protective ventilation  Titrate minute ventilation for pH 7.35  Plateau pressure 19  Requires 3-5 L supplemental O2 at baseline  Conservative fluid management  Prone ventilation as warranted  Norepinephrine gtt, decreasing requirement  Cardiology following  Lovenox for DVT prophylaxis  Discussed treatment plan with RN    Total critical care time: 48 minutes    Hugh Wyman MD   12/14/2022 11:30 AM

## 2022-12-15 NOTE — PROCEDURES
[facility] Vascular Access Team:  PICC Line Insertion Procedure Note      Patient: Martha Lu  YOB: 1950   MRN: [id number]  Room: [address] [place] [address]     Attending Physician - Essie Lynne MD  Ordering Physician - Essie Lynne MD    Diagnosis:   Respiratory failure [facility]) [J96.90]       Procedure(s): Insertion of 6 Serbian Triple Lumen PICC    Indication(s):   Long Term Antibiotic Therapy, Poor Venous Access, Critical Gtts, Frequent Lab Draws, and Physician/Patient Preference    Date of Procedure: 12/15/2022   Start Time: 1410  End Time: [phone]    Proceduralist: Dave Kim RN    Anesthesia: Local Injection <=5 mL 1% Lidocaine without Epinephrine    Estimated Blood Loss (mL): less than 50     Complications: Unable to thread first PICC line. Aborted first procedure and made a second attempt. PICC Double Lumen 70/67/46 Right Basilic (Active)   Central Line Being Utilized Yes 12/15/22 1547   Criteria for Appropriate Use Limited/no vessel suitable for conventional peripheral access 12/15/22 1547   Site Assessment Clean, dry & intact 12/15/22 1547   Phlebitis Assessment No symptoms 12/15/22 1547   Infiltration Assessment 0 12/15/22 1547   External Catheter Length (cm) 0 cm 12/15/22 1547   Proximal Lumen Color/Status White;Flushed;Normal saline locked; Blood return noted 12/15/22 1547   Distal Lumen Color/Status Pink;Flushed;Normal saline locked; Blood return noted 12/15/22 1547   Date of Last Dressing Change 12/15/22 12/15/22 1547   Dressing Type Transparent; Antimicrobial;Securing device 12/15/22 1547   Dressing Status New dressing applied;Clean, dry & intact 12/15/22 1547   Dressing Intervention New 12/15/22 1547         Findings:   1. Successful insertion of PICC line with second attempt. 2. PICC Line is ready to be used. 3. Please change tubing prior to using new PICC line. Make sure to label, date and use alcohol caps on new tubing and alcohol caps on unused ports.         Detailed Description of Procedure: The Right Cephalic vein was visualized using the ultrasound and deemed a suitable vessel. The area was prepped with Chlorhexidine and draped in sterile fashion using full max barrier draping. The area was anesthetized with 3 cc's of 1% Lidocaine. The vein was accessed using US guidance. The guidewire was advanced through the needle to secure the vein. The needle was removed and a peel-a-way Sheath was placed over the guidewire. Guidewire was removed with tip intact. The 6 Samoan Triple Lumen PICC was trimmed at 40 cm and inserted into the Sheath. Using VPS technology, attempted to thread PICC line without success. PICC line with internal components were removed, along with the peel-a-way Sheath. Pressure was placed over insertion site. An occlusive dressing was applied to the site. The Right Basilic vein was visualized using the ultrasound and deemed a suitable vessel. The area was prepped with Chlorhexidine and draped in sterile fashion using full max barrier draping. The area was anesthetized with 3 cc's of 1% Lidocaine. The vein was accessed using US guidance. The guidewire was advanced through the needle to secure the vein. The needle was removed and a peel-a-way Sheath was placed over the guidewire. Guidewire was removed with atraumatic tip intact. The 5.5 Samoan Double Lumen PICC was trimmed at 46 cm and inserted into the Sheath. Using VPS technology, the PICC line was advanced until PICC tip was in the SVC. The peel-a-way sheath was removed and PICC was inserted until the CAJ was reached. The PICC was advanced until P wave deflection was captured. The PICC was withdrawn until the optimal P wave amplitude was obtained. Approximately 0 cm exposed. Internal components of the PICC were removed, all lumens had brisk blood return and was flushed with 10 cc of NS. The PICC was secured using a securement device and a Biopatch was placed over the insertion site.  A Tegaderm was placed over the securement device and insertion site. Dressing was dated and initialed with external measurement marked. Patient did tolerate procedure well.             Electronically signed by Kylie Vega RN on 12/15/2022 at 3:49 PM

## 2022-12-15 NOTE — PROGRESS NOTES
Occupational Therapy    Per chart review, pt. Still intubated and sedated. Will remove from OT eval list.  Please reconsult if deemed appropriate.   Electronically signed by Gabi Guajardo OT on 12/15/2022 at 1:28 PM

## 2022-12-15 NOTE — PROGRESS NOTES
Comprehensive Nutrition Assessment    Type and Reason for Visit:  Reassess    Nutrition Recommendations/Plan:   Propofol rate has been decreased, increase TF rate to 27ml/hr     Malnutrition Assessment:  Malnutrition Status: At risk for malnutrition (Comment) (12/15/22 1402)    Context:  Acute Illness     Findings of the 6 clinical characteristics of malnutrition:  Energy Intake:  Mild decrease in energy intake (Comment)  Weight Loss:  No significant weight loss     Body Fat Loss:  No significant body fat loss     Muscle Mass Loss:  No significant muscle mass loss    Fluid Accumulation:  No significant fluid accumulation Extremities   Strength:  Not Performed    Nutrition Assessment:    EN continues at 15ml/hr with Proteinex TID. Tolerating with residuals 5-10ml. Propofol 6.3ml/hr    Nutrition Related Findings:    on vent Wound Type: None       Current Nutrition Intake & Therapies:    Average Meal Intake: NPO  Average Supplements Intake: NPO  Current Tube Feeding (TF) Orders:  Feeding Route: Orogastric  Formula: Standard with Fiber  Schedule: Continuous  Feeding Regimen: Jevity 1.5 @ 27ml/hr with Proteinex TID  Additives/Modulars: Protein  Water Flushes: 30ml hourly  Current TF & Flush Orders Provides: Jevity 1.5 @ 15ml/hr with Proteinex TID = 852 kcals with 101g protein, 994 ml free water from formula and flush  Goal TF & Flush Orders Provides: Jevity @ 27ml/hr with Proteinex TID = 1284 kcals with 119g protein, 139g CHO and 1212 ml free water from formula and flush. Propofol adds abnother 166 NP kcals    Anthropometric Measures:  Height: 5' 7\" (170.2 cm)  Ideal Body Weight (IBW): 135 lbs (61 kg)       Current Body Weight: 230 lb (104.3 kg),   IBW.     Current BMI (kg/m2): 36  BMI Categories: Obese Class 2 (BMI 35.0 -39.9)    Estimated Daily Nutrient Needs:  Energy Requirements Based On: Kcal/kg  Weight Used for Energy Requirements: Current  Energy (kcal/day): 8772-8455 kcals/day (11-14)  Weight Used for Protein Requirements: Ideal  Protein (g/day): 122 g/protein/day (2.0)  Method Used for Fluid Requirements: 1 ml/kcal  Fluid (ml/day): 5205-6776 mL/day    Nutrition Diagnosis:   Inadequate oral intake related to acute injury/trauma, impaired respiratory function as evidenced by NPO or clear liquid status due to medical condition, intubation, nutrition support - enteral nutrition    Nutrition Interventions:   Food and/or Nutrient Delivery: Continue Current Tube Feeding  Nutrition Education/Counseling: No recommendation at this time  Coordination of Nutrition Care: Continue to monitor while inpatient       Goals:  Previous Goal Met: Progressing toward Goal(s)  Goals: Tolerate nutrition support at goal rate       Nutrition Monitoring and Evaluation:   Behavioral-Environmental Outcomes: None Identified  Food/Nutrient Intake Outcomes: Enteral Nutrition Intake/Tolerance  Physical Signs/Symptoms Outcomes: Biochemical Data, Fluid Status or Edema, Weight, Skin    Discharge Planning:     Too soon to determine     Vazquez Liu MS, RD, LD  Contact: [phone]

## 2022-12-15 NOTE — PLAN OF CARE
Problem: Nutrition Deficit:  Goal: Optimize nutritional status  Outcome: Progressing  Flowsheets (Taken 12/15/2022 6232)  Nutrient intake appropriate for improving, restoring, or maintaining nutritional needs:   Assess nutritional status and recommend course of action   Recommend, monitor, and adjust tube feedings and TPN/PPN based on assessed needs

## 2022-12-15 NOTE — ACP (ADVANCE CARE PLANNING)
Advance Care Planning     Advance Care Planning Activator (Inpatient)  Conversation Note      Date of ACP Conversation: 12/15/2022     Conversation Conducted with: Pt's daughter and HCS, Ifrah Abdalla    ACP Activator: Aiyana Barroso RN        Health Care Decision Maker:     Current Designated Health Care Decision Maker:     Primary Decision MakerAda Vasquez - [phone]      Care Preferences    Ventilation: \"If you were in your present state of health and suddenly became very ill and were unable to breathe on your own, what would your preference be about the use of a ventilator (breathing machine) if it were available to you? \"      Would the patient desire the use of ventilator (breathing machine)?:    YES          Resuscitation  \"CPR works best to restart the heart when there is a sudden event, like a heart attack, in someone who is otherwise healthy. Unfortunately, CPR does not typically restart the heart for people who have serious health conditions or who are very sick. \"    \"In the event your heart stopped as a result of an underlying serious health condition, would you want attempts to be made to restart your heart (answer \"yes\" for attempt to resuscitate) or would you prefer a natural death (answer \"no\" for do not attempt to resuscitate)? \"    YES         Conversation Outcomes:  [x] ACP discussion completed  [x] Existing advance directive reviewed with patient; no changes to patient's previously recorded wishes.

## 2022-12-15 NOTE — ADT AUTH CERT
Intubated and sedated.        Assessment and Plan:   COVID-19 bilateral pneumonia   Influenza A infection   Rhinovirus infection   Klebsiella pneumoniae respiratory infection   Brief PEA arrest   Septic shock   Ventilator dependent acute hypoxemic and hypercapnic respiratory failure   History of COPD   Tobacco dependence   Morbid obesity   Transaminitis   DOLORES       Dexamethasone   Baricitinib   Oseltamavir   Broad-spectrum antibiotics   Adjuvant therapy   Lung protective ventilation   Titrate minute ventilation for pH 7.35   Plateau pressure 19   Requires 3-5 L supplemental O2 at baseline   Conservative fluid management   Prone ventilation as warranted   Norepinephrine gtt since weaned off   Cardiology following   Lovenox for DVT prophylaxis   Discussed treatment plan with daughter (Ifrah)/RN               Respiratory Failure GRG - Care Day 2 (12/14/2022) by Shirlene Haq RN       Review Status Review Entered   Completed 12/14/2022 1328       Created By   Shirlene Haq RN      Criteria Review      Care Day: 2 Care Date: 12/14/2022 Level of Care: ICU    Guideline Day 2    Level Of Care    (X) ICU or ventilator-capable area    12/14/2022 2:28 PM EST by Robbie Reyes      ICCU admission  [id number]  Cont VS, cardiac monitor, Oximetry  Home meds per NGT  Lovenox sc bid  Olumiant 2mg po daily  Tamiflu po bid  Decadron 6mg ivp q12hrs  Merrem ivpb q8hrs  Vancomycin ivpb q12hrs    Clinical Status    ( ) * Weaning assessment performed    12/14/2022 2:28 PM EST by Fransisco Farris on Vent FIO2 60& O2 sat 95%    Interventions    (X) Inpatient interventions continue    12/14/2022 2:28 PM EST by Robbie Reyes      NS 100cc/hr  Fentanyl drip titrated  Levophed drip titrated  Propofol drip titrated    12/14/2022 2:28 PM EST by Robbie Reyes    Subject: Additional Clinical Information    12/14/22 Review      VS 97.9, 61, 26, 114/61, 95% on Vent 60% FIO2              CXR today pending              Liver US also pending              Wbc 12.0      BUN 44, Crea 1.5, Gluc 146, Ca 7.8      CRP 10.60              Sputum Cx Klebsiella pneumoniae               Blood cx no growth              Hospitalist Note      Patient seen in [place] [address] critical care unit. Intubated and sedated      In an effort to reduce COVID-19 exposure, patient seen from doorway and case discussed in detail with unit staff      Klebsiella pneumoniae respiratory infection      DOLORES         Dexamethasone    Baricitinib    Oseltamavir    Broad-spectrum antibiotics    Adjuvant therapy    Lung protective ventilation    Titrate minute ventilation for pH 7.35    Plateau pressure 19    Requires 3-5 L supplemental O2 at baseline    Conservative fluid management    Prone ventilation as warranted    Norepinephrine gtt, decreasing requirement    Cardiology following    Lovenox for DVT prophylaxis       * Milestone   Additional Notes   12/14/22 Review    VS 97.9, 61, 26, 114/61, 95% on Vent 60% FIO2        CXR today pending        Liver US also pending        Wbc 12.0    BUN 44, Crea 1.5, Gluc 146, Ca 7.8    CRP 10.60        Sputum Cx Klebsiella pneumoniae         Blood cx no growth        Hospitalist Note    Patient seen in [place] [address] critical care unit.   Intubated and sedated   In an effort to reduce COVID-19 exposure, patient seen from doorway and case discussed in detail with unit staff   Klebsiella pneumoniae respiratory infection   DOLORES       Dexamethasone   Baricitinib   Oseltamavir   Broad-spectrum antibiotics   Adjuvant therapy   Lung protective ventilation   Titrate minute ventilation for pH 7.35   Plateau pressure 19   Requires 3-5 L supplemental O2 at baseline   Conservative fluid management   Prone ventilation as warranted   Norepinephrine gtt, decreasing requirement   Cardiology following   Lovenox for DVT prophylaxis

## 2022-12-15 NOTE — PROGRESS NOTES
Palliative Care follow up:    Review of chart and discussed with pt's nurse. Pt remains sedated and intubated. FiO2 60, on propofol and fentanyl. with tube feedings. Met with pt's daughter, Mike Pavon who is present to offer support. Daughter states pt has been intubated in the past and able to wean off the vent. She remains hopeful that her mother will respond to treatment and improve to extubate again. Although, she shares that her mother was in poor condition prior, with tobacco dependence and general poor self care. Ifrah states, she will honor pt's wishes \"not to have a trach\" and if that decision must be made she would elect comfort measures. Ifrah provided a copy of pt's DPOA with [facility] and a copy was taken for the chart. Encouragement and support provided. Spiritual care offered. Will continue to follow POC.       Electronically signed by Angelina Card RN on 12/15/2022 at 11:39 AM

## 2022-12-15 NOTE — PROGRESS NOTES
Hospitalist Progress Note  [facility]     Patient: Nery Murphy  : 1950  MRN: [id number]  Code Status: Full Code    Hospital Day: 2   Date of Service: 12/15/2022    Subjective:   Patient seen in [place] [address] critical care unit. Intubated and sedated. Past Medical History:   Diagnosis Date    Abdominal hernia     COPD (chronic obstructive pulmonary disease) (HCC)     Gastritis     Hyperlipidemia     Hypertension     Pneumonia     Streptococcal pharyngitis     UTI (urinary tract infection)        Past Surgical History:   Procedure Laterality Date    CHOLECYSTECTOMY      GASTRIC BYPASS SURGERY      HYSTERECTOMY (CERVIX STATUS UNKNOWN)      JOINT REPLACEMENT Bilateral     knee       No family history on file. Social History     Socioeconomic History    Marital status:       Spouse name: Not on file    Number of children: Not on file    Years of education: Not on file    Highest education level: Not on file   Occupational History    Not on file   Tobacco Use    Smoking status: Every Day     Packs/day: 1.00     Years: 40.00     Pack years: 40.00     Types: Cigarettes    Smokeless tobacco: Never   Substance and Sexual Activity    Alcohol use: Not on file    Drug use: Not on file    Sexual activity: Not on file   Other Topics Concern    Not on file   Social History Narrative    Not on file     Social Determinants of Health     Financial Resource Strain: Not on file   Food Insecurity: Not on file   Transportation Needs: Not on file   Physical Activity: Not on file   Stress: Not on file   Social Connections: Not on file   Intimate Partner Violence: Not on file   Housing Stability: Not on file       Current Facility-Administered Medications   Medication Dose Route Frequency Provider Last Rate Last Admin    meropenem (MERREM) 1000 mg in sodium chloride 0.9% 50 mL (duplex)  1,000 mg IntraVENous Q12H Janie Wong MD 16.7 mL/hr at 12/15/22 0916 1,000 mg at 12/15/22 0916    oseltamivir 6mg/ml (TAMIFLU) suspension 30 mg  30 mg Oral BID Shorty Meadows MD   30 mg at 12/15/22 0749    norepinephrine (LEVOPHED) 16 mg in sodium chloride 0.9 % 250 mL infusion  1-100 mcg/min IntraVENous Continuous Shorty Meadows MD   Stopped at 12/14/22 1752    fentaNYL (SUBLIMAZE) 2500 mcg in sodium chloride 0.9% 250 mL premix   mcg/hr IntraVENous Continuous Shorty Meadows MD 5 mL/hr at 12/15/22 0627 50 mcg/hr at 12/15/22 0627    sodium chloride flush 0.9 % injection 5-40 mL  5-40 mL IntraVENous 2 times per day Shorty Meadows MD   10 mL at 12/15/22 0758    sodium chloride flush 0.9 % injection 5-40 mL  5-40 mL IntraVENous PRN Shorty Meadows MD        0.9 % sodium chloride infusion   IntraVENous PRN Shorty Meadows MD   Stopped at 12/14/22 2320    polyethylene glycol (GLYCOLAX) packet 17 g  17 g Oral Daily PRN Shorty Meadows MD        acetaminophen (TYLENOL) suppository 650 mg  650 mg Rectal Q6H PRN Shorty Meadows MD        propofol injection  5-50 mcg/kg/min IntraVENous Continuous Shorty Meadows MD 5.6 mL/hr at 12/15/22 1105 9 mcg/kg/min at 12/15/22 1105    pantoprazole (PROTONIX) 40 mg in sodium chloride (PF) 0.9 % 10 mL injection  40 mg IntraVENous Daily Shorty Meadows MD   40 mg at 12/15/22 0755    albuterol sulfate HFA (PROVENTIL;VENTOLIN;PROAIR) 108 (90 Base) MCG/ACT inhaler 2 puff  2 puff Inhalation Q4H WA Shorty Meadows MD   2 puff at 12/15/22 0758    ipratropium (ATROVENT HFA) 17 MCG/ACT inhaler 2 puff  2 puff Inhalation Q4H While awake Shorty Meadows MD   2 puff at 12/15/22 0902    dexamethasone (PF) (DECADRON) injection 6 mg  6 mg IntraVENous Q12H Salima Mckenzie MD   6 mg at 12/15/22 0753    acetaminophen (TYLENOL) tablet 650 mg  650 mg Oral Q6H PRN Salima Mckenzie MD        guaiFENesin tablet 400 mg  400 mg Oral Q8H Salima Mckenzie MD   400 mg at 12/15/22 0751    melatonin disintegrating tablet 10 mg  10 mg Oral Nightly Scar Anders MD   10 mg at 12/14/22 2029    ascorbic acid (VITAMIN C) tablet 500 mg  500 mg Oral BID Scar Anders MD   500 mg at 12/15/22 0750    Vitamin D (CHOLECALCIFEROL) tablet 2,000 Units  2,000 Units Oral Daily Scar Anders MD   2,000 Units at 12/15/22 0751    zinc sulfate (ZINCATE) capsule 50 mg  50 mg Oral Daily Scar Anders MD   50 mg at 12/15/22 0752    LORazepam (ATIVAN) injection 1 mg  1 mg IntraVENous Q1H PRN William Clement MD   1 mg at 12/15/22 0902    baricitinib (OLUMIANT) tablet 2 mg  2 mg Oral Daily Scar Anders MD   2 mg at 12/15/22 0751    [Held by provider] furosemide (LASIX) injection 20 mg  20 mg IntraVENous BID Melissa Jackson MD   20 mg at 12/13/22 1834    atorvastatin (LIPITOR) tablet 40 mg  40 mg Oral Nightly Melissa Jackson MD   40 mg at 12/14/22 2029    enoxaparin (LOVENOX) injection 80 mg  1 mg/kg (Adjusted) SubCUTAneous BID Melissa Jackson MD   80 mg at 12/15/22 0855    vitamin D (ERGOCALCIFEROL) capsule 50,000 Units  50,000 Units Oral Weekly Makenna Dowling MD   50,000 Units at 12/14/22 0837         norepinephrine Stopped (12/14/22 1752)    fentaNYL 50 mcg/hr (12/15/22 9129)    sodium chloride Stopped (12/14/22 2320)    propofol 9 mcg/kg/min (12/15/22 1105)        Objective:   BP (!) 99/47   Pulse 54   Temp 97.5 °F (36.4 °C) (Temporal)   Resp 18   Ht 5' 7\" (1.702 m)   Wt 230 lb 2.6 oz (104.4 kg)   SpO2 93%   BMI 36.05 kg/m²     In an effort to reduce COVID-19 exposure, patient seen from doorway and case discussed in detail with unit staff    Recent Labs     12/13/22  0215 12/14/22  0520 12/15/22  0430   WBC 7.3 12.0* 10.9*   RBC 4.25 3.91* 3.80*   HGB 12.1 11.3* 10.9*   HCT 37.6 34.4* 34.1*   MCV 88.5 88.0 89.7   MCH 28.5 28.9 28.7   MCHC 32.2* 32.8* 32.0*    142 139     Recent Labs     12/13/22  0215 12/13/22  0224 12/14/22  0520 12/15/22  0411 12/15/22  0430     --  139  --  141   K 4.4   < > 3.7 3.5 3.7   ANIONGAP 13  --  10  --  10   --  101  --  103   CO2 25  --  28  --  28   BUN 24*  --  44*  --  55*   CREATININE 1.1*  --  1.5*  --  1.3*   GLUCOSE 136*  --  146*  --  190*   CALCIUM 8.6*  --  7.8*  --  8.1*    < > = values in this interval not displayed. Recent Labs     12/13/22 0215 12/14/22  0520 12/15/22  0430   MG 2.0 2.0 2.3   PHOS 4.1  --   --      Recent Labs     12/13/22 0215 12/14/22  0520 12/15/22  0430   AST 1,049* 810* 775*   * 515* 565*   BILITOT 1.5* 0.5 0.4   ALKPHOS 167* 146* 167*     No results for input(s): PH, PO2, PCO2, HCO3, BE, O2SAT in the last 72 hours. Recent Labs     12/13/22 0215 12/13/22  0749   TROPONINI 0.52* 0.38*     No results for input(s): INR in the last 72 hours. Recent Labs     12/13/22  0230   LACTA 2.2*         Intake/Output Summary (Last 24 hours) at 12/15/2022 1110  Last data filed at 12/15/2022 6055  Gross per 24 hour   Intake 1179.27 ml   Output 1400 ml   Net -220.73 ml       No results found.      Assessment and Plan:   COVID-19 bilateral pneumonia  Influenza A infection  Rhinovirus infection  Klebsiella pneumoniae respiratory infection  Brief PEA arrest  Septic shock  Ventilator dependent acute hypoxemic and hypercapnic respiratory failure  History of COPD  Tobacco dependence  Morbid obesity  Transaminitis  DOLORES     Dexamethasone  Baricitinib  Oseltamavir  Broad-spectrum antibiotics  Adjuvant therapy  Lung protective ventilation  Titrate minute ventilation for pH 7.35  Plateau pressure 19  Requires 3-5 L supplemental O2 at baseline  Conservative fluid management  Prone ventilation as warranted  Norepinephrine gtt since weaned off  Cardiology following  Lovenox for DVT prophylaxis  Discussed treatment plan with daughter (Ifrah)/RN    Total critical care time: 48 minutes    Pee Plaza MD   12/15/2022 11:10 AM

## 2022-12-15 NOTE — PROGRESS NOTES
Cardiology Progress Note Teja Lozano MD      Patient:  Pancho Hael  [id number]    Patient Active Problem List    Diagnosis Date Noted    Non-STEMI (non-ST elevated myocardial infarction) ([facility] [address] 12/13/2022     Priority: High    Acute systolic heart failure ([facility] [address] 12/13/2022     Priority: High    Respiratory failure ([address] 12/13/2022     Priority: Medium    Palliative care patient 12/13/2022     Priority: Medium       Admit Date:  12/13/2022    Admission Problem List: Present on Admission:   Respiratory failure ([facility] [address]   Non-STEMI (non-ST elevated myocardial infarction) ([facility] [address]   Acute systolic heart failure [facility])      Cardiac Specific Data:  Specialty Problems          Cardiology Problems    Acute systolic heart failure ([facility] [address]        Non-STEMI (non-ST elevated myocardial infarction) ([facility] [address]         1. Acute hypercapnic respiratory failure, mechanically ventilated with longstanding ongoing tobacco use and COPD. 2.  Acute COVID infection with positive influenza A and rhinovirus infection, possible right lower lobe consolidation/effusion, Klebsiella pneumonia. 3.  Non-STEMI with severe apical hypokinesis, ejection fraction 45%, Takotsubo versus LAD territory ischemia. Subjective:  Ms. Aristides Vazquez remains intubated and sedated. Off pressors. FiO2 requirement still 60%. Objective:   BP (!) 99/46   Pulse 52   Temp 97.4 °F (36.3 °C) (Temporal)   Resp 18   Ht 5' 7\" (1.702 m)   Wt 230 lb 2.6 oz (104.4 kg)   SpO2 95%   BMI 36.05 kg/m²       Intake/Output Summary (Last 24 hours) at 12/15/2022 1752  Last data filed at 12/15/2022 1200  Gross per 24 hour   Intake 1144.23 ml   Output 1100 ml   Net 44.23 ml       Prior to Admission medications    Medication Sig Start Date End Date Taking?  Authorizing Provider   lisinopril (PRINIVIL;ZESTRIL) 40 MG tablet Take 40 mg by mouth daily   Yes Historical Provider, MD   montelukast (SINGULAIR) 10 MG tablet Take 10 mg by mouth nightly   Yes Historical Provider, MD   busPIRone (BUSPAR) 30 MG tablet Take 30 mg by mouth in the morning and at bedtime   Yes Historical Provider, MD   hydrOXYzine HCl (ATARAX) 50 MG tablet Take 50 mg by mouth nightly   Yes Historical Provider, MD   mirtazapine (REMERON SOL-TAB) 30 MG disintegrating tablet Take 45 mg by mouth nightly   Yes Historical Provider, MD   gabapentin (NEURONTIN) 300 MG capsule Take 300 mg by mouth 3 times daily. Yes Historical Provider, MD   sennosides (SENOKOT) 8.8 MG/5ML syrup Take 5 mLs by mouth nightly   Yes Historical Provider, MD   HYDROcodone-acetaminophen (NORCO) 7.5-325 MG per tablet Take 1 tablet by mouth every 6 hours as needed for Pain.    Yes Historical Provider, MD   ondansetron (ZOFRAN-ODT) 4 MG disintegrating tablet Take 4 mg by mouth every 8 hours as needed for Nausea or Vomiting   Yes Historical Provider, MD   folic acid (FOLVITE) 1 MG tablet Take 1 mg by mouth daily   Yes Historical Provider, MD   buPROPion (WELLBUTRIN XL) 150 MG extended release tablet Take 150 mg by mouth every morning   Yes Historical Provider, MD   meloxicam (MOBIC) 7.5 MG tablet Take 7.5 mg by mouth daily   Yes Historical Provider, MD   rosuvastatin (CRESTOR) 20 MG tablet Take 20 mg by mouth nightly   Yes Historical Provider, MD   DULoxetine (CYMBALTA) 60 MG extended release capsule Take 60 mg by mouth nightly 2 capsules   Yes Historical Provider, MD   metoprolol succinate (TOPROL XL) 100 MG extended release tablet Take 100 mg by mouth nightly   Yes Historical Provider, MD   levothyroxine (SYNTHROID) 25 MCG tablet Take 25 mcg by mouth nightly   Yes Historical Provider, MD        lidocaine 1 % injection  5 mL IntraDERmal Once    sodium chloride flush  5-40 mL IntraVENous 2 times per day    meropenem  1,000 mg IntraVENous Q12H    oseltamivir 6mg/ml  30 mg Oral BID    pantoprazole (PROTONIX) 40 mg injection  40 mg IntraVENous Daily    albuterol sulfate HFA  2 puff Inhalation Q4H WA    ipratropium  2 puff Inhalation Q4H While awake    dexamethasone  6 mg IntraVENous Q12H    guaiFENesin  400 mg Oral Q8H    melatonin  10 mg Oral Nightly    vitamin C  500 mg Oral BID    Vitamin D  2,000 Units Oral Daily    zinc sulfate  50 mg Oral Daily    baricitinib  2 mg Oral Daily    [Held by provider] furosemide  20 mg IntraVENous BID    atorvastatin  40 mg Oral Nightly    enoxaparin  1 mg/kg (Adjusted) SubCUTAneous BID    vitamin D  50,000 Units Oral Weekly       TELEMETRY: Sinus bradycardia    Physical Exam:      Physical Exam  Constitutional:       Appearance: She is obese. Comments: Intubated and sedated   Cardiovascular:      Comments: No obvious jugular venous distention  No pitting edema  Extremities cool    Pulmonary:      Comments: Air entry present in both lung fields  On mechanical ventilation FiO2 of 60%  Abdominal:      Comments: Bowel sounds present  No palpable organomegaly   Skin:     General: Skin is warm. Neurological:      Comments: Sedated but moving extremities               Lab Data:  CBC:   Recent Labs     12/13/22  0215 12/14/22  0520 12/15/22  0430   WBC 7.3 12.0* 10.9*   HGB 12.1 11.3* 10.9*   HCT 37.6 34.4* 34.1*   MCV 88.5 88.0 89.7    142 139     BMP:   Recent Labs     12/13/22  0215 12/13/22  0224 12/14/22  0520 12/15/22  0411 12/15/22  0430     --  139  --  141   K 4.4   < > 3.7 3.5 3.7     --  101  --  103   CO2 25  --  28  --  28   PHOS 4.1  --   --   --   --    BUN 24*  --  44*  --  55*   CREATININE 1.1*  --  1.5*  --  1.3*    < > = values in this interval not displayed. LIVER PROFILE:   Recent Labs     12/13/22  0215 12/14/22  0520 12/15/22  0430   AST 1,049* 810* 775*   * 515* 565*   LIPASE 20  --   --    BILITOT 1.5* 0.5 0.4   ALKPHOS 167* 146* 167*     PT/INR: No results for input(s): PROTIME, INR in the last 72 hours. APTT: No results for input(s): APTT in the last 72 hours. BNP:  No results for input(s): BNP in the last 72 hours.   CK, CKMB, Troponin: @LABRCNT (CKTOTAL:3, CKMB:3, TROPONINI:3)@    IMAGING:  US LIVER    Result Date: 12/15/2022  NO PRIOR REPORT AVAILABLE Exam: ULTRASOUND OF THE ABDOMEN, LIMITED Clinical data:Elevated LFTs. History of cholecystectomy. Flu A, positive COVID. Patient is currently intubated. Technique: Real-time grayscale imaging of the abdomen was performed. Images supplemented with color Doppler technique. Prior studies: No prior studies submitted. Findings: The liver is echogenic in echotexture and enlarged to 20.4 cm. No evidence of a discrete hepatic mass lesion. No discrete hepatic mass. No intrahepatic biliary distention. Status post cholecystectomy. The common bile duct is normal in caliber,  measuring 0.45 cm. The right kidney njwoyoso00.4 X 4.7 x 4.9 cm with cortical thickness of 1 cm. Normal echogenicity and cortical thickness are preserved. No evidence of renal masses. No evidence of renal calculi. There is no evidence of hydronephrosis. The pancreas is obscured. Spleen measuring8.1 X 10.5 X 4.6 cm with scattered hyperechoic foci. Imaged portion of the aorta demonstrates no acute pathology, proximal visualized. Visualized portion of the inferior vena cava is unremarkable, proximal visualized. No ascites is evident. Echogenic hepatomegaly to 20.4 cm suggesting steatosis. Status post cholecystectomy. Recommendation: Follow up as clinically indicated. Dictated and Electronically Signed by Gaurang Marroquin MD at 15-Dec-2022 01:40:35 PM             XR CHEST PORTABLE    Result Date: 12/15/2022  CLINICAL HISTORY: VDRF TECHNIQUE: Single AP view of the chest COMPARISON: CXR from 12/14/2022 FINDINGS: Lines and tubes: ET tube and subdiaphragmatic enteric tube appear appropriately positioned. . Cardiomediastinal: Normal size and configuration of the cardiomediastinal silhouette. No pneumomediastinum. Calcific atherosclerosis of the aortic arch. Lungs and pleura: No significant change in right lower lobe opacity. No pleural effusion. No pneumothorax. Musculoskeletal: No acute osseous abnormalities. Osseous degenerative changes. Other: None. Unchanged right lower lobe opacity. Lines and tubes are appropriately positioned. XR CHEST PORTABLE    Result Date: 12/15/2022  NO PRIOR REPORT AVAILABLE Exam: X-RAY OF [facility] Clinical data:VDRF. Technique:Single portable semierect view of the chest. Prior studies: Radiograph of the chest dated 12/13/2022. Findings: The lungs show, loosely organized bibasilar infiltrates with possible effusions. ET and NG tubes in good position. 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 Multiple small benign nodules seen in both lung fields. Cardiac silhouette is within normal limits. No acute osseous abnormality is detected. 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 Multiple small benign nodules in both lungs. Recommendation: Follow up as clinically indicated. Dictated and Electronically Signed by Rosalia Malcolm MD at 15-Dec-2022 03:12:47 PM             XR CHEST PORTABLE    Result Date: 12/13/2022  Patient: Herbert Becker  Time Out: 02:48Exam(s): FILM CXR 1 VIEW EXAM:  XR Chest, 1 ViewCLINICAL HISTORY:   Reason for exam: ETT and OG tube placement. TECHNIQUE:  Frontal view of the chest.COMPARISON:  No relevant prior studies available. FINDINGS:  Lungs:  Peripheral consolidation in the right lung base is matched by patchy opacity in the medial left lung base. Pleural space:  Unremarkable. No pneumothorax. Heart:  Unremarkable. No cardiomegaly. Mediastinum:  Unremarkable. Bones/joints:  Unremarkable. Tubes, lines and devices:  Endotracheal tube terminates 6.3 cm above the elle. Enteric tube passes below the diaphragms, tip projecting over the gastric fundus. Peripheral consolidation in the right lung base is matched by patchy opacity in the medial left lung base. Electronically signed by Bonnie Santiago MD on 12-13-22 at 9085        Assessment and Plan: This is a 81y.o. year old female with past medical history of active ongoing longstanding tobacco use with COPD, prior history of acute cardiomyopathy 2016 without significant follow-up, presenting with progressively worsening shortness of breath with hypercapnic respiratory failure requiring mechanical ventilation with positive COVID infection, positive human rhinovirus and influenza A, right lower lobe consolidation, sputum with Klebsiella, non-STEMI with apical severe hypokinesis, mildly reduced overall LV function. 1.  Noted sinus bradycardia in the 50s but currently hemodynamically stable off pressors. QT prolongation with deep T wave inversions which could be consistent with Takotsubo's cardiomyopathy. Continue to monitor at this time and will follow closely. 2.  Creatinine decreased to 1.3 of diuresis. Still high oxygen requirements with FiO2 of 60%.       Teja Lozano MD, MD 12/15/2022 5:52 PM

## 2022-12-16 NOTE — PROGRESS NOTES
Comprehensive Nutrition Assessment    Type and Reason for Visit:  Reassess    Nutrition Recommendations/Plan:   Increase tf rate to 29ml/hour      Malnutrition Assessment:  Malnutrition Status: At risk for malnutrition (Comment) (12/16/22 1203)    Context:  Acute Illness     Findings of the 6 clinical characteristics of malnutrition:  Energy Intake:  Mild decrease in energy intake (Comment)  Weight Loss:  No significant weight loss     Body Fat Loss:  No significant body fat loss     Muscle Mass Loss:  No significant muscle mass loss    Fluid Accumulation:  No significant fluid accumulation Extremities   Strength:  Not Performed    Nutrition Assessment:    Propofol has been discontinued. TF is currently at 27ml/hr. Tolerating with residuals 6-20ml. Will increase tf to 29ml/hr with 35ml free water flush hourly    Nutrition Related Findings:    on vent .   Glucose 189, A1C 5.3  on Decadron Wound Type: None       Current Nutrition Intake & Therapies:    Average Meal Intake: NPO  Average Supplements Intake: NPO  Diet NPO Exceptions are: Sips of Water with Meds  ADULT TUBE FEEDING; Orogastric; Standard with Fiber; Continuous; 27; No; 30; Q 1 hour; Protein; Flush tube with Proteinex TID  Current Tube Feeding (TF) Orders:  Feeding Route: Orogastric  Formula: Standard without Fiber  Schedule: Continuous  Feeding Regimen: Jevity 1.5 @ 29ml/hr with Proteinex TID  Additives/Modulars: Protein  Water Flushes: 35ml hourly  Current TF & Flush Orders Provides: Jevity 1.5 @ 27ml/hr with 3 Proteinex = 1284 kcals with 19 protein, 139g CHO and 1212 ml free water from formula and flush  Goal TF & Flush Orders Provides: Jevity 1.5 @ 29ml/hr with 3 Proteinex = 1353 kcals with 122g protein, 150g CHO and 1368ml free wate from formula and flush    Anthropometric Measures:  Height: 5' 7\" (170.2 cm)  Ideal Body Weight (IBW): 135 lbs (61 kg)    Admission Body Weight: 230 lb 2.6 oz (104.4 kg)  Current Body Weight: 230 lb 2.6 oz (104.4 kg), 170.5 % IBW. Current BMI (kg/m2): 36  BMI Categories: Obese Class 2 (BMI 35.0 -39.9)    Estimated Daily Nutrient Needs:  Energy Requirements Based On: Kcal/kg  Weight Used for Energy Requirements: Current  Energy (kcal/day): 6486-5355 kcals/day (11-14)  Weight Used for Protein Requirements: Ideal  Protein (g/day): 122 g/protein/day (2.0)  Method Used for Fluid Requirements: 1 ml/kcal  Fluid (ml/day): 3580-0278 mL/day    Nutrition Diagnosis:   Inadequate oral intake related to acute injury/trauma, impaired respiratory function as evidenced by NPO or clear liquid status due to medical condition, intubation, nutrition support - enteral nutrition    Nutrition Interventions:   Food and/or Nutrient Delivery: Continue Current Tube Feeding  Nutrition Education/Counseling: No recommendation at this time  Coordination of Nutrition Care: Continue to monitor while inpatient  Plan of Care discussed with: nursing    Goals:  Previous Goal Met: Progressing toward Goal(s)  Goals: Tolerate nutrition support at goal rate       Nutrition Monitoring and Evaluation:   Behavioral-Environmental Outcomes: None Identified  Food/Nutrient Intake Outcomes: Enteral Nutrition Intake/Tolerance  Physical Signs/Symptoms Outcomes: Biochemical Data, Fluid Status or Edema, Weight, Skin    Discharge Planning:     Too soon to determine     Arianna Adame MS, RD, LD  Contact: [phone]

## 2022-12-16 NOTE — PROGRESS NOTES
[facility] Vascular Access Team:  Rounding Note (CLABSI Bundle Compliance)    Jd Muir  [id number]   Admitted - 12/13/2022  1:19 AM  Admission Diagnosis -   Respiratory failure [facility]) [J96.90]  Attending Physician - Chepe Robbins MD    Active LDAs -   PICC Double Lumen 74/29/98 Right Basilic (Active)   Central Line Being Utilized Yes 12/16/22 1602   Criteria for Appropriate Use Limited/no vessel suitable for conventional peripheral access 12/16/22 1602   Site Assessment Clean, dry & intact 12/16/22 1602   Phlebitis Assessment No symptoms 12/16/22 1602   Infiltration Assessment 0 12/16/22 1602   External Catheter Length (cm) 0 cm 12/16/22 0400   Proximal Lumen Color/Status White; Infusing 12/16/22 1602   Distal Lumen Color/Status Pink; Infusing 12/16/22 1602   Line Care Connections checked and tightened 12/16/22 1602   Alcohol Cap Used Yes 12/16/22 1602   Date of Last Dressing Change 12/15/22 12/16/22 1602   Dressing Type Transparent; Antimicrobial;Securing device 12/16/22 1602   Dressing Status Clean, dry & intact 12/16/22 1602   Dressing Intervention New 12/15/22 1600       Arterial Line 12/12/22 Right Femoral (Active)   Site Assessment Clean, dry & intact 12/16/22 1602   Line Status Intact and in place; Arterial fluids per protocol 12/16/22 1602   Art Line Interventions Zeroed and calibrated; Connections checked and tightened;Flushed per protocol 12/16/22 1602   Color/Movement/Sensation Capillary refill less than 3 sec; Cool fingers/toes 12/16/22 1602   Dressing Type Transparent 12/16/22 1602   Dressing Status Clean, dry & intact 12/16/22 1602   Dressing Intervention New 12/15/22 1600       CLABSI Bundle:  Dressing Type: Tegaderm  Continued Need for Line: Yes  Indication: Critical Gtts  Last Dressing Change: 12/15/2022  CHG Biopatch/Gel in Place: Yes  Dressing Intact: Yes  Dressing PRN: Yes  Redness: No    FINDINGS:  Both a 5.5 [place] Dual Lumen PICC and Femoral arterial line are in place in patient.  The patient has been off vasopressor drugs for over 24 hours. Contacted intensivist MD to discuss possible removal of arterial line as it is in high risk area for infection. Previous Femoral CVC was removed yesterday. Dr. Ze Mcclendon okay with removal of arterial line as long as patient remains off vasopressors today. Spoke with primary RN and also suggested removal of the two additional PIVs still in patient. IMPRESSION/PLAN:  1. MD agreeable to removal of Femoral arterial line  2. Remove any PIVs not needed to reduce risk of CLABSI.       Electronically Signed By: Tremaine Kelly RN on 12/16/2022 at 4:57 PM

## 2022-12-16 NOTE — PROGRESS NOTES
Pharmacy Renal Dose Adjustment      Recent Labs     12/15/22  0430 12/16/22  0425   BUN 55* 57*       Recent Labs     12/15/22  0430 12/16/22  0425   CREATININE 1.3* 1.1*       Estimated Creatinine Clearance: 59 mL/min (A) (based on SCr of 1.1 mg/dL (H)). Plan: Changed Merrem to 1 gm IV q8h due to patients renal function improved. Pharmacy will continue to follow and make adjustments as needed.     Electronically signed by Eva Kearney, [address] [place] [address] on 12/16/2022 at 1:55 PM

## 2022-12-16 NOTE — PROGRESS NOTES
Palliative Care follow up:    Pt remains on the ventilator. Report from her nurse, that sedation is off at this time and currently Fi02 down to 35. Met daughter as I was leaving the unit. She was encouraged that pt was making positive progress. Encouraged and supported. Will continue to follow.     Electronically signed by Lydia Sarmiento RN on 12/16/2022 at 3:42 PM

## 2022-12-16 NOTE — PROGRESS NOTES
Hospitalist Progress Note  [facility]     Patient: Jd Muir  : 1950  MRN: [id number]  Code Status: Full Code    Hospital Day: 3   Date of Service: 2022    Subjective:   Patient seen in [place] [address] critical care unit. Intubated and sedated. Past Medical History:   Diagnosis Date    Abdominal hernia     COPD (chronic obstructive pulmonary disease) (HCC)     Gastritis     Hyperlipidemia     Hypertension     Pneumonia     Streptococcal pharyngitis     UTI (urinary tract infection)        Past Surgical History:   Procedure Laterality Date    CHOLECYSTECTOMY      GASTRIC BYPASS SURGERY      HYSTERECTOMY (CERVIX STATUS UNKNOWN)      JOINT REPLACEMENT Bilateral     knee       No family history on file. Social History     Socioeconomic History    Marital status:       Spouse name: Not on file    Number of children: Not on file    Years of education: Not on file    Highest education level: Not on file   Occupational History    Not on file   Tobacco Use    Smoking status: Every Day     Packs/day: 1.00     Years: 40.00     Pack years: 40.00     Types: Cigarettes    Smokeless tobacco: Never   Substance and Sexual Activity    Alcohol use: Not on file    Drug use: Not on file    Sexual activity: Not on file   Other Topics Concern    Not on file   Social History Narrative    Not on file     Social Determinants of Health     Financial Resource Strain: Not on file   Food Insecurity: Not on file   Transportation Needs: Not on file   Physical Activity: Not on file   Stress: Not on file   Social Connections: Not on file   Intimate Partner Violence: Not on file   Housing Stability: Not on file       Current Facility-Administered Medications   Medication Dose Route Frequency Provider Last Rate Last Admin    carvedilol (COREG) tablet 3.125 mg  3.125 mg Oral BID  Marjan Haro MD   3.125 mg at 22 0937    lidocaine PF 1 % injection 5 mL  5 mL IntraDERmal Once Chepe Robbins MD sodium chloride flush 0.9 % injection 5-40 mL  5-40 mL IntraVENous 2 times per day Meli Olsen MD   10 mL at 12/16/22 0749    sodium chloride flush 0.9 % injection 5-40 mL  5-40 mL IntraVENous PRN Meli Olsen MD        0.9 % sodium chloride infusion  25 mL IntraVENous PRN Meli Olsen MD        meropenem (MERREM) 1000 mg in sodium chloride 0.9% 50 mL (duplex)  1,000 mg IntraVENous Q12H Meli Olsen MD 16.7 mL/hr at 12/16/22 1100 1,000 mg at 12/16/22 1100    oseltamivir 6mg/ml (TAMIFLU) suspension 30 mg  30 mg Oral BID Ailyn Caraballo MD   30 mg at 12/16/22 2628    norepinephrine (LEVOPHED) 16 mg in sodium chloride 0.9 % 250 mL infusion  1-100 mcg/min IntraVENous Continuous Ailyn Caraballo MD   Stopped at 12/14/22 1752    fentaNYL (SUBLIMAZE) 2500 mcg in sodium chloride 0.9% 250 mL premix   mcg/hr IntraVENous Continuous Ailyn Caraballo MD   Stopped at 12/16/22 0730    polyethylene glycol (GLYCOLAX) packet 17 g  17 g Oral Daily PRN Ailyn Caraballo MD        acetaminophen (TYLENOL) suppository 650 mg  650 mg Rectal Q6H PRN Ailyn Caraballo MD        propofol injection  5-50 mcg/kg/min IntraVENous Continuous Ailyn Caraballo MD   Stopped at 12/16/22 0730    pantoprazole (PROTONIX) 40 mg in sodium chloride (PF) 0.9 % 10 mL injection  40 mg IntraVENous Daily Ailyn Caraballo MD   40 mg at 12/16/22 0746    albuterol sulfate HFA (PROVENTIL;VENTOLIN;PROAIR) 108 (90 Base) MCG/ACT inhaler 2 puff  2 puff Inhalation Q4H WA Ailyn Caraballo MD   2 puff at 12/16/22 0750    ipratropium (ATROVENT HFA) 17 MCG/ACT inhaler 2 puff  2 puff Inhalation Q4H While awake Ailyn Caraballo MD   2 puff at 12/16/22 1101    dexamethasone (PF) (DECADRON) injection 6 mg  6 mg IntraVENous Q12H Meli Olsen MD   6 mg at 12/16/22 0747    acetaminophen (TYLENOL) tablet 650 mg  650 mg Oral Q6H PRN Meli Olsen MD        guaiFENesin tablet 400 mg  400 mg Oral Q8H Sky Alie Montano MD   400 mg at 12/16/22 0746    melatonin disintegrating tablet 10 mg  10 mg Oral Nightly Scar Anders MD   10 mg at 12/15/22 2132    ascorbic acid (VITAMIN C) tablet 500 mg  500 mg Oral BID Scar Anders MD   500 mg at 12/16/22 0745    Vitamin D (CHOLECALCIFEROL) tablet 2,000 Units  2,000 Units Oral Daily Scar Anders MD   2,000 Units at 12/16/22 0746    zinc sulfate (ZINCATE) capsule 50 mg  50 mg Oral Daily Scar Anders MD   50 mg at 12/16/22 0746    LORazepam (ATIVAN) injection 1 mg  1 mg IntraVENous Q1H PRN William Clement MD   1 mg at 12/15/22 1230    baricitinib (OLUMIANT) tablet 2 mg  2 mg Oral Daily Scar Anders MD   2 mg at 12/16/22 0746    [Held by provider] furosemide (LASIX) injection 20 mg  20 mg IntraVENous BID Melissa Jackson MD   20 mg at 12/13/22 1834    atorvastatin (LIPITOR) tablet 40 mg  40 mg Oral Nightly Melissa Jackson MD   40 mg at 12/15/22 2132    enoxaparin (LOVENOX) injection 80 mg  1 mg/kg (Adjusted) SubCUTAneous BID Melissa Jackson MD   80 mg at 12/16/22 0828    vitamin D (ERGOCALCIFEROL) capsule 50,000 Units  50,000 Units Oral Weekly Makenna Dowling MD   50,000 Units at 12/14/22 0837         sodium chloride      norepinephrine Stopped (12/14/22 1752)    fentaNYL Stopped (12/16/22 0730)    propofol Stopped (12/16/22 0730)        Objective:   /68   Pulse 70   Temp 97.9 °F (36.6 °C)   Resp 18   Ht 5' 7\" (1.702 m)   Wt 230 lb 2.6 oz (104.4 kg)   SpO2 93%   BMI 36.05 kg/m²     In an effort to reduce COVID-19 exposure, patient seen from doorway and case discussed in detail with unit staff    Recent Labs     12/14/22  0520 12/15/22  0430 12/16/22  0425   WBC 12.0* 10.9* 14.9*   RBC 3.91* 3.80* 3.83*   HGB 11.3* 10.9* 11.0*   HCT 34.4* 34.1* 34.2*   MCV 88.0 89.7 89.3   MCH 28.9 28.7 28.7   MCHC 32.8* 32.0* 32.2*    139 158     Recent Labs     12/14/22  0520 12/15/22  0411 12/15/22  0430 12/16/22  0425     --  141 141   K 3.7 3.5 3.7 4.0   ANIONGAP 10  --  10 8     --  103 102   CO2 28  --  28 31*   BUN 44*  --  55* 57*   CREATININE 1.5*  --  1.3* 1.1*   GLUCOSE 146*  --  190* 189*   CALCIUM 7.8*  --  8.1* 8.2*     Recent Labs     12/14/22  0520 12/15/22  0430 12/16/22  0425   MG 2.0 2.3 2.5*     Recent Labs     12/14/22  0520 12/15/22  0430 12/16/22  0425   * 775* 300*   * 565* 449*   BILITOT 0.5 0.4 0.3   ALKPHOS 146* 167* 172*     No results for input(s): PH, PO2, PCO2, HCO3, BE, O2SAT in the last 72 hours. No results for input(s): TROPONINI in the last 72 hours. No results for input(s): INR in the last 72 hours. No results for input(s): LACTA in the last 72 hours. Intake/Output Summary (Last 24 hours) at 12/16/2022 1156  Last data filed at 12/16/2022 0800  Gross per 24 hour   Intake 1563.93 ml   Output 1040 ml   Net 523.93 ml       XR CHEST PORTABLE    Result Date: 12/15/2022  CLINICAL HISTORY: VDRF TECHNIQUE: Single AP view of the chest COMPARISON: CXR from 12/14/2022 FINDINGS: Lines and tubes: ET tube and subdiaphragmatic enteric tube appear appropriately positioned. . Cardiomediastinal: Normal size and configuration of the cardiomediastinal silhouette. No pneumomediastinum. Calcific atherosclerosis of the aortic arch. Lungs and pleura: No significant change in right lower lobe opacity. No pleural effusion. No pneumothorax. Musculoskeletal: No acute osseous abnormalities. Osseous degenerative changes. Other: None. Unchanged right lower lobe opacity. Lines and tubes are appropriately positioned.      Assessment and Plan:   COVID-19 bilateral pneumonia  Influenza A infection  Rhinovirus infection  Klebsiella pneumoniae respiratory infection  Brief PEA arrest  Septic shock  Ventilator dependent acute hypoxemic and hypercapnic respiratory failure  History of COPD  Tobacco dependence  Morbid obesity  Transaminitis  DOLORES     Dexamethasone  Baricitinib  Oseltamavir  Broad-spectrum antibiotics  Adjuvant therapy  Lung protective ventilation  Titrate minute ventilation for pH 7.35  Requires 3-5 L supplemental O2 at baseline  Conservative fluid management  Prone ventilation as warranted  Norepinephrine gtt since weaned off  Cardiology following  Lovenox for DVT prophylaxis  Discussed treatment plan with daughter (Ifrah)/RN    Total critical care time: 39 minutes    Janie Wong MD   12/16/2022 11:56 AM

## 2022-12-17 NOTE — PROGRESS NOTES
Time of death 36 confirmed by Lisa Forman and Nathen Macdonald RN.     Electronically signed by Sai Bryant RN on 12/17/2022 at 8:59 AM

## 2022-12-17 NOTE — DISCHARGE SUMMARY
Hospitalist Discharge Summary  [facility]    Patient: Tariq Grubbs  : 1950  MRN: [id number]  Attending: Carrie Hays MD  Admission Date: 2022  Discharge Date: 2022    Hospital Course:   COVID-19 bilateral pneumonia  Influenza A infection  Rhinovirus infection  Klebsiella pneumoniae respiratory infection  Brief PEA arrest  Septic shock  Probable Takotsubo cardiomyopathy  Prolonged QT  Ventilator dependent acute hypoxemic and hypercapnic respiratory failure  History of COPD  Tobacco dependence  Morbid obesity  Transaminitis  DOLORES  Hypertension  Hyperlipidemia     Patient suffered a second cardiac arrest of prolonged duration on 2022. Please refer to code navigator for detailed timeline, medications administered, and interventions performed. ROSC ultimately achieved however daughter Annette Welch) arrived prior to this and requested comfort care measures. Ray Spencer RN present for entire discussion. Patient  peacefully on 2022 at [phone] while on comfort care measures. Discharge Exam:   Patient     Recent Labs     12/15/22  0430 12/16/22  0425 12/17/22  0637 22  0717   WBC 10.9* 14.9* 40.2*  --    HGB 10.9* 11.0* 7.4* 7.8*    158 284  --      Recent Labs     12/15/22  0430 12/16/22  0425 12/17/22  0626 22  0637    141  --  140   K 3.7 4.0 5.0 5.7*    102  --  100   CO2 28 31*  --  21*   BUN 55* 57*  --  65*   CREATININE 1.3* 1.1*  --  1.6*   GLUCOSE 190* 189*  --  463*     Recent Labs     12/15/22  0430 22  0425 22  0637   * 300* 232*   * 449* 286*   BILITOT 0.4 0.3 0.5   ALKPHOS 167* 172* 134*     Troponin T: No results for input(s): TROPONINI in the last 72 hours. Pro-BNP: No results for input(s): BNP in the last 72 hours. INR: No results for input(s): INR in the last 72 hours.   UA:No results for input(s): NITRITE, COLORU, PHUR, LABCAST, WBCUA, RBCUA, MUCUS, TRICHOMONAS, YEAST, BACTERIA, CLARITYU, SPECGRAV, LEUKOCYTESUR, UROBILINOGEN, BILIRUBINUR, BLOODU, GLUCOSEU, AMORPHOUS in the last 72 hours. A1C: No results for input(s): LABA1C in the last 72 hours.   ABG:  Recent Labs     12/17/22  0626   PHART 7.230*   NVF0FMO 38.0   PO2ART 321.0*   RDU2SBK 15.9*   BEART -10.8*   HGBAE 7.7*   [id number] 97.2   CARBOXHGBART 1.5     Discharge Disposition: Jeromy Street MD  12/17/2022 9:22 AM

## 2022-12-17 NOTE — PROGRESS NOTES
25ml Precedex wasted  75ml propofol wasted  100ml Fentanyl wasted     Electronically signed by Abel Jean RN on 12/17/2022 at 10:41 AM     Wasted with Adelita Brenner RN.    Electronically signed by Vanessa Osorio RN on 12/17/2022 at 10:52 AM

## 2022-12-17 NOTE — PROGRESS NOTES
Patient art line removed @ [phone]. Pressure held for 15 minutes, and pressure dressing applied. No indication of hematoma at that time. Re-checked site at 0400. .. noted hematoma. Size marked on leg. Pressure held for 30 minutes, and pressure dressing re-applied. Patient site re-checked at 0530. Hematoma still present, but not larger. Pressure held over site for 10 minutes and pressure dressing re-applied.

## 2022-12-18 LAB
BLOOD CULTURE, ROUTINE: NORMAL
BLOOD CULTURE, ROUTINE: NORMAL
CULTURE, BLOOD 2: NORMAL
CULTURE, BLOOD 2: NORMAL
EKG P AXIS: 48 DEGREES
EKG P AXIS: 48 DEGREES
EKG P-R INTERVAL: 156 MS
EKG P-R INTERVAL: 156 MS
EKG Q-T INTERVAL: 508 MS
EKG Q-T INTERVAL: 508 MS
EKG QRS DURATION: 76 MS
EKG QRS DURATION: 76 MS
EKG QTC CALCULATION (BAZETT): 503 MS
EKG QTC CALCULATION (BAZETT): 503 MS
EKG T AXIS: -176 DEGREES
EKG T AXIS: -176 DEGREES

## 2022-12-19 LAB
EKG P AXIS: 68 DEGREES
EKG P-R INTERVAL: 154 MS
EKG Q-T INTERVAL: 336 MS
EKG QRS DURATION: 70 MS
EKG QTC CALCULATION (BAZETT): 435 MS
EKG T AXIS: -158 DEGREES